# Patient Record
Sex: FEMALE | Race: BLACK OR AFRICAN AMERICAN
[De-identification: names, ages, dates, MRNs, and addresses within clinical notes are randomized per-mention and may not be internally consistent; named-entity substitution may affect disease eponyms.]

---

## 2020-06-30 ENCOUNTER — HOSPITAL ENCOUNTER (INPATIENT)
Dept: HOSPITAL 72 - EMR | Age: 65
LOS: 7 days | Discharge: NURSING, REHAB OR LONG-TERM CARE | DRG: 311 | End: 2020-07-07
Payer: MEDICARE

## 2020-06-30 VITALS — SYSTOLIC BLOOD PRESSURE: 156 MMHG | DIASTOLIC BLOOD PRESSURE: 81 MMHG

## 2020-06-30 VITALS — SYSTOLIC BLOOD PRESSURE: 182 MMHG | DIASTOLIC BLOOD PRESSURE: 88 MMHG

## 2020-06-30 VITALS — WEIGHT: 166.13 LBS | HEIGHT: 68 IN | BODY MASS INDEX: 25.18 KG/M2

## 2020-06-30 VITALS — SYSTOLIC BLOOD PRESSURE: 166 MMHG | DIASTOLIC BLOOD PRESSURE: 83 MMHG

## 2020-06-30 VITALS — DIASTOLIC BLOOD PRESSURE: 91 MMHG | SYSTOLIC BLOOD PRESSURE: 127 MMHG

## 2020-06-30 VITALS — DIASTOLIC BLOOD PRESSURE: 81 MMHG | SYSTOLIC BLOOD PRESSURE: 155 MMHG

## 2020-06-30 VITALS — DIASTOLIC BLOOD PRESSURE: 70 MMHG | SYSTOLIC BLOOD PRESSURE: 159 MMHG

## 2020-06-30 VITALS — DIASTOLIC BLOOD PRESSURE: 72 MMHG | SYSTOLIC BLOOD PRESSURE: 154 MMHG

## 2020-06-30 DIAGNOSIS — M48.061: ICD-10-CM

## 2020-06-30 DIAGNOSIS — M54.16: ICD-10-CM

## 2020-06-30 DIAGNOSIS — M19.90: ICD-10-CM

## 2020-06-30 DIAGNOSIS — R25.2: ICD-10-CM

## 2020-06-30 DIAGNOSIS — Z86.19: ICD-10-CM

## 2020-06-30 DIAGNOSIS — E11.9: ICD-10-CM

## 2020-06-30 DIAGNOSIS — Z86.73: ICD-10-CM

## 2020-06-30 DIAGNOSIS — K57.90: ICD-10-CM

## 2020-06-30 DIAGNOSIS — I10: ICD-10-CM

## 2020-06-30 DIAGNOSIS — E83.110: ICD-10-CM

## 2020-06-30 DIAGNOSIS — I24.9: Primary | ICD-10-CM

## 2020-06-30 DIAGNOSIS — M47.892: ICD-10-CM

## 2020-06-30 LAB
ADD MANUAL DIFF: NO
ALBUMIN SERPL-MCNC: 3.9 G/DL (ref 3.4–5)
ALBUMIN/GLOB SERPL: 1 {RATIO} (ref 1–2.7)
ALP SERPL-CCNC: 77 U/L (ref 46–116)
ALT SERPL-CCNC: 28 U/L (ref 12–78)
ANION GAP SERPL CALC-SCNC: 8 MMOL/L (ref 5–15)
AST SERPL-CCNC: 21 U/L (ref 15–37)
BASOPHILS NFR BLD AUTO: 0.8 % (ref 0–2)
BILIRUB SERPL-MCNC: 0.3 MG/DL (ref 0.2–1)
BUN SERPL-MCNC: 16 MG/DL (ref 7–18)
CALCIUM SERPL-MCNC: 8.7 MG/DL (ref 8.5–10.1)
CHLORIDE SERPL-SCNC: 103 MMOL/L (ref 98–107)
CK SERPL-CCNC: 191 U/L (ref 26–308)
CO2 SERPL-SCNC: 28 MMOL/L (ref 21–32)
CREAT SERPL-MCNC: 1 MG/DL (ref 0.55–1.3)
EOSINOPHIL NFR BLD AUTO: 1.9 % (ref 0–3)
ERYTHROCYTE [DISTWIDTH] IN BLOOD BY AUTOMATED COUNT: 11.6 % (ref 11.6–14.8)
GLOBULIN SER-MCNC: 4 G/DL
HCT VFR BLD CALC: 39.4 % (ref 37–47)
HGB BLD-MCNC: 13.5 G/DL (ref 12–16)
LYMPHOCYTES NFR BLD AUTO: 30 % (ref 20–45)
MCV RBC AUTO: 96 FL (ref 80–99)
MONOCYTES NFR BLD AUTO: 9.5 % (ref 1–10)
NEUTROPHILS NFR BLD AUTO: 57.8 % (ref 45–75)
PLATELET # BLD: 274 K/UL (ref 150–450)
POTASSIUM SERPL-SCNC: 4 MMOL/L (ref 3.5–5.1)
RBC # BLD AUTO: 4.11 M/UL (ref 4.2–5.4)
SODIUM SERPL-SCNC: 139 MMOL/L (ref 136–145)
WBC # BLD AUTO: 7 K/UL (ref 4.8–10.8)

## 2020-06-30 PROCEDURE — 83735 ASSAY OF MAGNESIUM: CPT

## 2020-06-30 PROCEDURE — 93970 EXTREMITY STUDY: CPT

## 2020-06-30 PROCEDURE — 84484 ASSAY OF TROPONIN QUANT: CPT

## 2020-06-30 PROCEDURE — 86140 C-REACTIVE PROTEIN: CPT

## 2020-06-30 PROCEDURE — 86780 TREPONEMA PALLIDUM: CPT

## 2020-06-30 PROCEDURE — 71045 X-RAY EXAM CHEST 1 VIEW: CPT

## 2020-06-30 PROCEDURE — 82962 GLUCOSE BLOOD TEST: CPT

## 2020-06-30 PROCEDURE — 83921 ORGANIC ACID SINGLE QUANT: CPT

## 2020-06-30 PROCEDURE — 82607 VITAMIN B-12: CPT

## 2020-06-30 PROCEDURE — 86592 SYPHILIS TEST NON-TREP QUAL: CPT

## 2020-06-30 PROCEDURE — 80053 COMPREHEN METABOLIC PANEL: CPT

## 2020-06-30 PROCEDURE — 84520 ASSAY OF UREA NITROGEN: CPT

## 2020-06-30 PROCEDURE — 72141 MRI NECK SPINE W/O DYE: CPT

## 2020-06-30 PROCEDURE — 80048 BASIC METABOLIC PNL TOTAL CA: CPT

## 2020-06-30 PROCEDURE — 36415 COLL VENOUS BLD VENIPUNCTURE: CPT

## 2020-06-30 PROCEDURE — 99285 EMERGENCY DEPT VISIT HI MDM: CPT

## 2020-06-30 PROCEDURE — 85025 COMPLETE CBC W/AUTO DIFF WBC: CPT

## 2020-06-30 PROCEDURE — 93005 ELECTROCARDIOGRAM TRACING: CPT

## 2020-06-30 PROCEDURE — 82550 ASSAY OF CK (CPK): CPT

## 2020-06-30 PROCEDURE — 71120 X-RAY EXAM BREASTBONE 2/>VWS: CPT

## 2020-06-30 PROCEDURE — 82565 ASSAY OF CREATININE: CPT

## 2020-06-30 PROCEDURE — 70553 MRI BRAIN STEM W/O & W/DYE: CPT

## 2020-06-30 PROCEDURE — 83036 HEMOGLOBIN GLYCOSYLATED A1C: CPT

## 2020-06-30 PROCEDURE — 86039 ANTINUCLEAR ANTIBODIES (ANA): CPT

## 2020-06-30 PROCEDURE — 72146 MRI CHEST SPINE W/O DYE: CPT

## 2020-06-30 PROCEDURE — 93306 TTE W/DOPPLER COMPLETE: CPT

## 2020-06-30 PROCEDURE — 85651 RBC SED RATE NONAUTOMATED: CPT

## 2020-06-30 PROCEDURE — 72148 MRI LUMBAR SPINE W/O DYE: CPT

## 2020-06-30 PROCEDURE — 96374 THER/PROPH/DIAG INJ IV PUSH: CPT

## 2020-06-30 RX ADMIN — INSULIN ASPART SCH UNITS: 100 INJECTION, SOLUTION INTRAVENOUS; SUBCUTANEOUS at 11:54

## 2020-06-30 RX ADMIN — INSULIN ASPART SCH UNITS: 100 INJECTION, SOLUTION INTRAVENOUS; SUBCUTANEOUS at 06:33

## 2020-06-30 RX ADMIN — MORPHINE SULFATE PRN MG: 4 INJECTION INTRAVENOUS at 06:50

## 2020-06-30 RX ADMIN — MORPHINE SULFATE PRN MG: 4 INJECTION INTRAVENOUS at 20:27

## 2020-06-30 RX ADMIN — ENOXAPARIN SODIUM SCH MG: 40 INJECTION SUBCUTANEOUS at 08:41

## 2020-06-30 RX ADMIN — INSULIN ASPART SCH UNITS: 100 INJECTION, SOLUTION INTRAVENOUS; SUBCUTANEOUS at 16:30

## 2020-06-30 RX ADMIN — MORPHINE SULFATE PRN MG: 4 INJECTION INTRAVENOUS at 13:09

## 2020-06-30 RX ADMIN — ENALAPRIL MALEATE SCH MG: 5 TABLET ORAL at 13:09

## 2020-06-30 RX ADMIN — INSULIN ASPART SCH UNITS: 100 INJECTION, SOLUTION INTRAVENOUS; SUBCUTANEOUS at 20:28

## 2020-06-30 RX ADMIN — MORPHINE SULFATE PRN MG: 4 INJECTION INTRAVENOUS at 03:13

## 2020-06-30 RX ADMIN — ASPIRIN 81 MG SCH MG: 81 TABLET ORAL at 13:09

## 2020-06-30 NOTE — NUR
NURSE NOTES:

Received patient report from DIANA Perez. Patient shows no signs of distress or pain at the 
time. Patient states she is feeling much better. AO x 4. Per RN, Dr. Sebastian is aware of 
troponin level being 0.167. IV patent and flushed. There are no signs of erythema, 
infiltration, or bleeding. Bed is in the lowest position, call light is within reach, side 
rails up x 3. Will continue to monitor. One more month of doxycycline has been sent in for the hidradenitis. Would advise pt, ok to start taking Uloric. Would take naproxen once daily with this for the first 3 months  to help try to prevent a flare up. Please let me know if he needs refills. Thanks.

## 2020-06-30 NOTE — NUR
HAND-OFF: 

Report given to DIANA Perez. Patient shows no signs of distress. Patient is still complaining 
about pain after morphine 4mg given. Endorsed plan of care.

## 2020-06-30 NOTE — CONSULTATION
DATE OF CONSULTATION:  06/30/2020

REFERRING PHYSICIAN:  Ketan Munroe M.D.



REASON FOR REFERRAL:  Chest pain.



HISTORY OF PRESENT ILLNESS:  This is a 64-year-old female who has several

medical problems as detailed below.  The patient presents to the hospital

because of recurrent bouts of pain in the shoulders on the left side,

sometimes on the right side.  She has been having some cramping in her

legs that come all the way up, sometimes from her foot or calf to her

thigh to her chest on the right side and to the shoulders and to the head,

and these pains usually last about 2 to 3 days, though she has been having

severe spasms more recently and the severe spasm would cause the patient

to come to the hospital.  She has a capacity to walk with a walker,

although her walker is loose.  She has had a couple of falls, although she

gets on her knees.  She does not end up falling down completely as she has

on prior occasions, where she was hospitalized at [facility] last year.  There

is occasional shortness of breath.  There is no PND, although she uses two

pillows at night to sleep with.  She starts out with many, but by the time

she wakes up in the morning, there are only 2 pillows and that is what she

is comfortable with.  She has occasional palpitations.  She denies any

lightheadedness.  She has not had any cardiac problems at all.



PAST MEDICAL HISTORY:  Positive for history of fall, multifactorial,

situational, mechanical, and small focus of left supraspinous tendon, not

full-thickness, secondary to fall, history of rib pain, flank pain, likely

felt to be musculoskeletal.  No rib fractures.  History of left hip pain

without a fracture, history of chest pain, shortness of breath, lumbar

radiculopathy, chronic low back pain, neck pain, diabetes mellitus type 2,

spondylosis, history of hepatitis C, for which she took Harvoni and got

cleared according to herself, history of hypertension, cervical

spondylosis, osteoarthritis, foraminal stenosis of the cervical spine,

hereditary hemochromatosis according to the chart, and history of

trigeminal neuralgia.  She has had a history of appendectomy and history

of back surgery with L5-S1 laminectomy, diskectomy, foraminectomy.



ALLERGIES:  She is reportedly allergic to doxycycline, Norco, and

tramadol.



SOCIAL HISTORY:  Quit 15 years ago tobacco.  Denies any alcohol or drugs.

She lives at home.  Her son lives with her.



REVIEW OF SYSTEMS:  GI:  Positive for some nausea and some constipation.

No black or bloody stools.    :  Negative.  PULMONARY:  Negative.

CONSTITUTIONAL:  Negative.    NEUROLOGIC:  As mentioned in HPI.



PHYSICAL EXAMINATION:

GENERAL:  A middle-aged female, in no respiratory distress, although in the

middle of my evaluation, she developed a severe spasm, started out in her

foot all the way down to her leg on the right side and eventually left.

She was very uncomfortable at that time.

LUNGS:  Clear to auscultation, percussion.

CARDIAC:  S1 is normal.  S2 is normal.  Regular rate and rhythm.  No heaves

or thrills.

CHEST WALL:  The deltopectoral groove area and the surrounding area is

tender to palpation, and she indicates this seems to be the same pain that

she has been having for the past few months intermittently.  She is not

able to lift her left arm up above her shoulder because of significant

pain.

ABDOMEN:  Soft, obese, positive bowel sounds.

EXTREMITIES:  There is no clubbing, cyanosis.  There is no edema.

NEUROLOGIC:  She is awake and alert and responsive.  Her spasm that she was

experiencing lasted only a matter of 15 seconds or so.



LABS:  Her white count is 7, hemoglobin 13.5, and platelet count of 274.

Sodium is 139, potassium 4.0, chloride 103, bicarb 28, BUN of 16,

creatinine 1.0, and glucose of 345.  Her blood sugar has been 242 to 345.

Troponins have been 0.014 and 0.0145, and vitamin B12 level is pending.

She did have x-ray of her chest that showed lungs and pleural space to be

clear.  No acute processes.



ASSESSMENT AND PLAN:

1. Chest pain, possible left chest wall pain.

2. Leg cramps.

3. Diabetes mellitus.

4. Hypertension.

5. Diverticulosis.

6. Cervical spine and lumbar spine radiculopathy.



Dr. Munroe, this patient was seen in cardiac consultation.  It seemed

that the pain may be exacerbated by movement of the left arm and then

palpation of the chest wall, suggestive the pain is related to

musculoskeletal in origin.  Her cardiac enzymes are minimally abnormal,

but appeared to be relatively flat.  A third one will be ordered.  Her

telemetry is sinus.  The EKG shows normal sinus rhythm, no ST or T-wave

abnormalities.  An echocardiogram will be ordered for evaluation of wall

motion and further recommendation depending on the results of the

testing.









  ______________________________________________

  Santo Sebastian M.D.





DR:  FAINA

D:  06/30/2020 13:16

T:  06/30/2020 16:34

JOB#:  [id number]

CC:

## 2020-06-30 NOTE — NUR
NURSE NOTES:

Received patient report from DONNY Maddox RN. Patient is AO x 4. Patient is in 9/ 10 pain from 
the muscles spasms that come and go on the right side of her leg and goes up to her chest. 
Patients blood pressure upon arrival was 205/97. IV is patent and flushed. There are no 
signs of erythema, infiltration, or bleeding. Bed is in the lowest position, call light is 
within reach. Will call her Doctor to add orders.

## 2020-06-30 NOTE — NUR
ED Nurse Note:



Pt is stable for transfer to tele unit at this time per ERMD. Pt is aaox4, no 
respiratory distress. Pt taken to unit via gurney, connected to cardiac monitor 
by tech and RN. Pt vitals are stable. Endorsed to receiving RN that pt was 
given morphine just prior to transfer to unit. Pt IV is patent and intact. Pt 
belongings sent with pt.

## 2020-06-30 NOTE — NUR
NURSE NOTES:

Spoke to Dr. Munroe. Informed him about patients troponin level being 0.145, no new orders 
given. Also informed him of the muscle spasms, he ordered baclofen 10 mg. For the high blood 
pressure he ordered hydralazine 25 mg. Ordered the patient to be on a sliding scale and on a 
diabetic diet. Will carry out orders.

## 2020-06-30 NOTE — EMERGENCY ROOM REPORT
History of Present Illness


General


Chief Complaint:  Pain


Source:  Patient





Present Illness


HPI


64-year-old female presents the ED for evaluation.  Complaining of cramping 

pain in her legs.  Started a few days ago.  Radiating to her abdomen and chest.

  10 out of 10, throbbing, nonradiating.  Denies shortness of breath.  States 

that she suffered a fall in October last year and had to stay in a skilled 

nursing facility until about January of this year.  States that he had cramping 

sensations like this here and there in the past but never this severe.  No 

other aggravating relieving factors.  Denies any other associated symptoms


Allergies:  


Coded Allergies:  


     No Known Allergies (Verified , 6/7/11)





COVID-19 Screening


Contact w/high risk pt:  No


Recent Travel to affected area:  No


Experienced COVID-19 symptoms?:  No


COVID-19 Testing performed PTA:  No





Patient History


Past Medical History:  DM, HTN


Past Surgical History:  none


Pertinent Family History:  none


Social History:  Denies: smoking, alcohol use, drug use


Pregnant Now:  No


Immunizations:  UTD


Reviewed Nursing Documentation:  PMH: Agreed; PSxH: Agreed





Nursing Documentation-PMH


Past Medical History:  No History, Except For


Hx Hypertension:  Yes


Hx Diabetes:  Yes





Review of Systems


All Other Systems:  negative except mentioned in HPI





Physical Exam





Vital Signs








  Date Time  Temp Pulse Resp B/P (MAP) Pulse Ox O2 Delivery O2 Flow Rate FiO2


 


6/30/20 00:45 98.6 82 16 127/91 (103) 99 Room Air  








Sp02 EP Interpretation:  reviewed, normal


General Appearance:  alert, GCS 15, non-toxic, mild distress


Head:  normocephalic, atraumatic


Eyes:  bilateral eye normal inspection, bilateral eye PERRL


ENT:  hearing grossly normal, normal pharynx, no angioedema, normal voice


Neck:  full range of motion, supple/symm/no masses


Respiratory:  chest non-tender, lungs clear, normal breath sounds, speaking 

full sentences


Cardiovascular #1:  regular rate, rhythm, no edema


Cardiovascular #2:  2+ carotid (R), 2+ carotid (L), 2+ radial (R), 2+ radial (L)

, 2+ dorsalis pedis (R), 2+ dorsalis pedis (L)


Gastrointestinal:  normal bowel sounds, non tender, soft, non-distended, no 

guarding, no rebound


Rectal:  deferred


Genitourinary:  normal inspection, no CVA tenderness


Musculoskeletal:  back normal, normal range of motion, gait/station normal, non-

tender


Neurologic:  alert, motor strength/tone normal, oriented x3, sensory intact, 

responsive, speech normal


Psychiatric:  judgement/insight normal, memory normal, mood/affect normal, no 

suicidal/homicidal ideation


Reflexes:  3+ bicep (R), 3+ bicep (L), 3+ tricep (R), 3+ tricep (L), 3+ knee (R)

, 3+ knee (L)


Skin:  no rash


Lymphatic:  no adenopathy





Medical Decision Making


Diagnostic Impression:  


 Primary Impression:  


 ACS (acute coronary syndrome)


 Additional Impression:  


 Muscle cramps


ER Course


Hospital Course 


64-year-old female presents with cramping pains in the legs abdomen and chest.





Differential diagnoses include: MI/unstable angina, contusion, muscle strain





Clinical course


Patient placed on stretcher. on cardiac monitor. After initial history and 

physical I ordered labs, EKG, chest x-ray, valium, morphine 





labs reviewed- no leukocytosis, hb/hct stable, electrolytes ok, trop 0.145


EKG - NSR no acute ischemic changes interpreted by me 


Chest x-ray- no acute process





given aspirin. given hydralazine for elevated BP





Case discussed with Dr. Munroe  and he agreed to accept the patient to his 

service for further care and support





I.  I feel this is a highly complex case requiring extensive working including 

EKG/Rhythm strip, Xray/CT/US, Blood/urine lab work, repeat exams while in ED, 

and administration of strong opiates/narcotics for pain control, admission to 

hospital or close patient follow up.  





Diagnosis - ACS, muscle cramps





admitted to telemetry in serious condition





Labs








Test


  6/30/20


01:15


 


White Blood Count


  7.0 K/UL


(4.8-10.8)


 


Red Blood Count


  4.11 M/UL


(4.20-5.40)


 


Hemoglobin


  13.5 G/DL


(12.0-16.0)


 


Hematocrit


  39.4 %


(37.0-47.0)


 


Mean Corpuscular Volume 96 FL (80-99) 


 


Mean Corpuscular Hemoglobin


  32.9 PG


(27.0-31.0)


 


Mean Corpuscular Hemoglobin


Concent 34.4 G/DL


(32.0-36.0)


 


Red Cell Distribution Width


  11.6 %


(11.6-14.8)


 


Platelet Count


  274 K/UL


(150-450)


 


Mean Platelet Volume


  7.1 FL


(6.5-10.1)


 


Neutrophils (%) (Auto)


  57.8 %


(45.0-75.0)


 


Lymphocytes (%) (Auto)


  30.0 %


(20.0-45.0)


 


Monocytes (%) (Auto)


  9.5 %


(1.0-10.0)


 


Eosinophils (%) (Auto)


  1.9 %


(0.0-3.0)


 


Basophils (%) (Auto)


  0.8 %


(0.0-2.0)


 


Sodium Level


  139 MMOL/L


(136-145)


 


Potassium Level


  4.0 MMOL/L


(3.5-5.1)


 


Chloride Level


  103 MMOL/L


()


 


Carbon Dioxide Level


  28 MMOL/L


(21-32)


 


Anion Gap


  8 mmol/L


(5-15)


 


Blood Urea Nitrogen


  16 mg/dL


(7-18)


 


Creatinine


  1.0 MG/DL


(0.55-1.30)


 


Estimat Glomerular Filtration


Rate > 60 mL/min


(>60)


 


Glucose Level


  345 MG/DL


()


 


Calcium Level


  8.7 MG/DL


(8.5-10.1)


 


Total Bilirubin


  0.3 MG/DL


(0.2-1.0)


 


Aspartate Amino Transf


(AST/SGOT) 21 U/L (15-37) 


 


 


Alanine Aminotransferase


(ALT/SGPT) 28 U/L (12-78) 


 


 


Alkaline Phosphatase


  77 U/L


()


 


Total Creatine Kinase


  191 U/L


()


 


Troponin I


  0.145 ng/mL


(0.000-0.056)


 


Total Protein


  7.9 G/DL


(6.4-8.2)


 


Albumin


  3.9 G/DL


(3.4-5.0)


 


Globulin 4.0 g/dL 


 


Albumin/Globulin Ratio 1.0 (1.0-2.7) 








EKG Diagnostic Results


Rate:  normal


Rhythm:  NSR


ST Segments:  no acute changes


ASA given to the pt in ED:  Yes





Rhythm Strip Diag. Results


EP Interpretation:  yes


Rhythm:  NSR, no PVC's, no ectopy





Chest X-Ray Diagnostic Results


Chest X-Ray Diagnostic Results :  


   Chest X-Ray Ordered:  Yes


   # of Views/Limited/Complete:  1 View


   Indication:  Chest Pain


   EP Interpretation:  Yes


   Interpretation:  no consolidation, no effusion, no pneumothorax, no acute 

cardiopulmonary disease


   Impression:  No acute disease


   Electronically Signed by:  Electronically signed by Salvador Roberts MD





Last Vital Signs








  Date Time  Temp Pulse Resp B/P (MAP) Pulse Ox O2 Delivery O2 Flow Rate FiO2


 


6/30/20 00:55 98.6 82 16 127/91 99 Room Air  








Status:  improved


Disposition:  ADMITTED AS INPATIENT


Condition:  Serious


Referrals:  


Ketan Munroe MD (PCP)











Salvador Roberts MD Jun 30, 2020 02:54

## 2020-06-30 NOTE — HISTORY AND PHYSICAL REPORT
DATE OF ADMISSION:  06/30/2020

HISTORY OF PRESENT ILLNESS:  This is a 64-year-old female with history of

falls, diabetes, history of small-focus tear of the left supraspinatus

muscle, hypertension, chronic left hip pain and lower back pain,

trigeminal neuralgia, hereditary hemochromatosis, who presented to the

emergency room for generalized body pain and severe muscle spasms that

were worsening over the last 3 days.  She states that she has leg pain

with spasms that travel up her body.  She also has lower back pain.  She

states her lower back pain is better with sitting and worse with standing.

She states her leg pain travels up her body accompanied by spasms and

leaves her with headaches and numbness.  She also has been having chest

pain, substernal in location, radiating to the left shoulder as well as

the neck over the last 6 weeks.  She states the chest pain has an

exertional component.  She denies any shortness of breath.  She has

history of cramping in the legs; however, nothing has been this severe.

She denies nocturnal cramping.  She denies cramping getting better with

moving her legs.



PAST MEDICAL HISTORY:  Includes diabetes, hypertension, dysautonomia,

chronic left hip pain, trigeminal neuralgia, and hereditary

hemochromatosis.



PAST SURGICAL HISTORY:  None.



SOCIAL HISTORY:  The patient is a nonsmoker.  She does not use any drugs or

drink alcohol.



MEDICATIONS:  Reviewed in [place].



ALLERGIES:  No known drug allergies.



REVIEW OF SYSTEMS:  A 12-point review of systems negative except for

pertinent positives as mentioned above.



PHYSICAL EXAMINATION:

VITAL SIGNS:  Temperature 98.6, pulse is 82, respiratory rate 16, blood

pressure 127/91.

GENERAL:  No acute distress.  The patient is alert, awake, and oriented

x3.

HEENT:  Normocephalic/atraumatic.

NECK:  Supple.  No JVD.

LUNGS:  Clear to auscultation bilaterally.  No crackles, rhonchi, or

rales.

CARDIOVASCULAR:  Regular rate and rhythm.  Normal S1, S2.

ABDOMEN:  Soft, nontender, and nondistended.

EXTREMITIES:  No clubbing, cyanosis, or edema.

NEUROLOGIC:  Motor, the patient can lift the right lower leg 4/5 and the

left lower leg 3/5.  Cranial nerves II through XII intact.



DIAGNOSTIC DATA:  EKG shows normal sinus rhythm.  No ST changes.  X-ray

shows no acute process.



LABORATORY DATA:  CBC, white count of 7, hemoglobin 13.5, and platelet

count 274,000.  BMP, sodium 139, potassium is 4, chloride is 103, CO2 is

28, BUN is 16, creatinine is 1, glucose is 345, AST is 21, ALT is 28,

alkaline phosphatase 77.  Troponin is 0.145.



ASSESSMENT:

1. Lower extremity pain, radiating up to the lower back, rule out spinal

stenosis.

2. Chest pain with intermediate troponin.

3. Diabetes.

4. Hypertension.

5. History of trigeminal neuralgia.

6. Hemochromatosis.



PLAN:

1. Admit the patient to telemetry.

2. Cardiology consult.

3. Neurology consult.

4. Baclofen 10 mg p.o. b.i.d. for muscle spasms.

5. MRI of spine.

6. Check ESR, vitamin B12, HAL.

7. Start aspirin.

8. Full code.

9. DVT prophylaxis given.









  ______________________________________________

  Ketan Munroe MD





DR:  CHARLY

D:  06/30/2020 12:27

T:  06/30/2020 18:02

JOB#:  [id number]

CC:

## 2020-06-30 NOTE — DIAGNOSTIC IMAGING REPORT
MRI LUMBAR SPINE WITHOUT CONTRAST

 

Indication: Reason For Exam: Weakness, spasms

 

Technique: Sagittal T1 and T2 fast spin echo, sagittal STIR, axial T1 and T2 fast

spin-echo images of the lumbar spine

 

Comparison: MRI lumbar spine dated 6/11/2011

 

Findings:

 

Lumbar vertebral body heights are maintained. There are multilevel reactive endplate

marrow changes. There is multilevel disc desiccation.

 

The conus is unremarkable and terminates at the T12-L1 level. 

 

L1-2: No significant disc bulge, central or foraminal stenosis. 

 

L2-3: Disc osteophyte complex formation with moderate broad-based disc bulge and

ligamentum flavum hypertrophy leading to lateral recess narrowing and mild spinal

canal stenosis. There is mild right and moderate left foraminal narrowing with

abutment of the exiting left L2 nerve root.

 

L3-4: Disc osteophyte complex formation with moderate broad-based disc bulge and

ligament flavum and hypertrophy leading to mild spinal canal stenosis. There is mild

bilateral foraminal narrowing with abutment of the exiting left L3 nerve root.

 

L4-5: Disc osteophyte complex formation with moderate broad-based disc bulge and

ligamentum flavum hypertrophy leading to mild spinal canal stenosis. There is lateral

recess narrowing. There is moderate bilateral foraminal narrowing with abutment of

the exiting L4 nerve roots.

 

L5-S1: Disc osteophyte complex formation with moderate left eccentric broad-based

disc herniation, ligamentum flavum hypertrophy, and bilateral facet arthrosis,

leading to bilateral lateral recess narrowing. There is severe left and moderate

right foraminal narrowing with abutment of the exiting left L5 nerve root

 

Impression: 

 

Multiple level discogenic degenerative disease as described in detail above, which

has progressed slightly from prior examination.

## 2020-06-30 NOTE — NUR
ED Nurse Note:



Pt brought into ED from home by [facility] for c/o generalized body pain with 
severe muscle spasms that have become worse over the last 3 days. Pt is aaox4, 
breathing is normal and unlabored. Pt appears moderately anxious. No cough, SOB 
or fever. Pt blood sugar was 394 on scene per [facility]. Pt connected to cardiac 
monitor, safety measures in place. Will continue to monitor.

## 2020-06-30 NOTE — NUR
CASE MANAGEMENT:REVIEW



64 YR OLD FEMALE BIBA FROM HOME



CC:BODY  PAIN AND MUSCLE SPASMS



SI: ACS. MUSCLE CRAMPS

98.6   82  16  127/91  99% ON RA

GLUCOSE+345  TROPONIN(+) 0.145 AND 0.140



IS: IV MORPHINE 

500CC NS BOLUS

VALIUM PO

ASA PO

CHEST XRAY

**: TO TELEMETRY 

DCP: FROM HOME



PLAN:

SERIAL TROPONIN'S

MRI SPINE

## 2020-06-30 NOTE — NUR
NURSE NOTES:

Received report from DIANA Wheatley. Pt awake, A/O x4, still complains of 8/10 pain that started 
from L lower extremity and radiates up to the chest. Morphine was administered per RN. 
Observed mild L lip drooping, per pt, she has a hx of TIA about 3yrs ago. No s/sx of acute 
distress, breathing even and unlabored in RA. Received a call from lab regarding troponin 
level of 0.140 (from 0.145 yesterday). Bed on lowest position, call light within reach. Will 
continue plan of care.

## 2020-07-01 VITALS — SYSTOLIC BLOOD PRESSURE: 132 MMHG | DIASTOLIC BLOOD PRESSURE: 72 MMHG

## 2020-07-01 VITALS — DIASTOLIC BLOOD PRESSURE: 81 MMHG | SYSTOLIC BLOOD PRESSURE: 167 MMHG

## 2020-07-01 VITALS — DIASTOLIC BLOOD PRESSURE: 78 MMHG | SYSTOLIC BLOOD PRESSURE: 126 MMHG

## 2020-07-01 VITALS — DIASTOLIC BLOOD PRESSURE: 79 MMHG | SYSTOLIC BLOOD PRESSURE: 157 MMHG

## 2020-07-01 VITALS — SYSTOLIC BLOOD PRESSURE: 164 MMHG | DIASTOLIC BLOOD PRESSURE: 76 MMHG

## 2020-07-01 VITALS — SYSTOLIC BLOOD PRESSURE: 133 MMHG | DIASTOLIC BLOOD PRESSURE: 76 MMHG

## 2020-07-01 LAB
CK SERPL-CCNC: 73 U/L (ref 26–140)
CREAT SERPL-MCNC: 0.9 MG/DL (ref 0.55–1.3)

## 2020-07-01 RX ADMIN — ENALAPRIL MALEATE SCH MG: 5 TABLET ORAL at 09:11

## 2020-07-01 RX ADMIN — METHYLPREDNISOLONE SCH MG: 4 TABLET ORAL at 21:09

## 2020-07-01 RX ADMIN — MORPHINE SULFATE PRN MG: 4 INJECTION INTRAVENOUS at 04:24

## 2020-07-01 RX ADMIN — ASPIRIN 81 MG SCH MG: 81 TABLET ORAL at 09:08

## 2020-07-01 RX ADMIN — MORPHINE SULFATE PRN MG: 4 INJECTION INTRAVENOUS at 22:08

## 2020-07-01 RX ADMIN — INSULIN ASPART SCH UNITS: 100 INJECTION, SOLUTION INTRAVENOUS; SUBCUTANEOUS at 17:25

## 2020-07-01 RX ADMIN — MORPHINE SULFATE PRN MG: 4 INJECTION INTRAVENOUS at 13:03

## 2020-07-01 RX ADMIN — ENOXAPARIN SODIUM SCH MG: 40 INJECTION SUBCUTANEOUS at 09:05

## 2020-07-01 RX ADMIN — INSULIN ASPART SCH UNITS: 100 INJECTION, SOLUTION INTRAVENOUS; SUBCUTANEOUS at 11:30

## 2020-07-01 RX ADMIN — INSULIN ASPART SCH UNITS: 100 INJECTION, SOLUTION INTRAVENOUS; SUBCUTANEOUS at 21:10

## 2020-07-01 RX ADMIN — CYCLOBENZAPRINE HYDROCHLORIDE SCH MG: 10 TABLET, FILM COATED ORAL at 23:04

## 2020-07-01 RX ADMIN — INSULIN ASPART SCH UNITS: 100 INJECTION, SOLUTION INTRAVENOUS; SUBCUTANEOUS at 06:00

## 2020-07-01 RX ADMIN — MORPHINE SULFATE PRN MG: 4 INJECTION INTRAVENOUS at 00:33

## 2020-07-01 RX ADMIN — HYDRALAZINE HYDROCHLORIDE PRN MG: 25 TABLET ORAL at 23:58

## 2020-07-01 RX ADMIN — MORPHINE SULFATE PRN MG: 4 INJECTION INTRAVENOUS at 09:08

## 2020-07-01 RX ADMIN — MORPHINE SULFATE PRN MG: 4 INJECTION INTRAVENOUS at 18:08

## 2020-07-01 NOTE — INTERNAL MED PROGRESS NOTE
Subjective


Physician Name


Ketan Munroe


Attending Physician


Ketan Munroe MD





Current Medications








 Medications


  (Trade)  Dose


 Ordered  Sig/Toni


 Route


 PRN Reason  Start Time


 Stop Time Status Last Admin


Dose Admin


 


 Acetaminophen


  (Tylenol)  650 mg  Q4H  PRN


 ORAL


 Mild Pain (Pain Scale 1-3)  6/30/20 02:30


 7/30/20 02:29   


 


 


 Aspirin


  (ASA)  81 mg  DAILY


 NG


   6/30/20 12:30


 8/14/20 12:29  7/1/20 09:08


 


 


 Baclofen


  (Lioresal)  10 mg  BID


 ORAL


   6/30/20 09:00


 7/30/20 08:59  7/1/20 17:20


 


 


 Dextrose


  (Dextrose 50%)  25 ml  Q30M  PRN


 IV


 Hypoglycemia  6/30/20 04:30


 9/28/20 04:29   


 


 


 Dextrose


  (Dextrose 50%)  50 ml  Q30M  PRN


 IV


 Hypoglycemia  6/30/20 04:30


 9/28/20 04:29   


 


 


 Enalapril Maleate


  (Vasotec)  10 mg  DAILY


 ORAL


   6/30/20 12:15


 7/30/20 12:14  7/1/20 09:11


 


 


 Enoxaparin Sodium


  (Lovenox)  40 mg  Q24H


 SUBQ


   6/30/20 09:00


 9/28/20 08:59  7/1/20 09:05


 


 


 Gadobutrol


  (Gadavist)  7.5 mmol  NOW  PRN


 IV


 Radiology Procedure  7/1/20 15:30


 7/5/20 15:23   


 


 


 Hydralazine HCl


  (Apresoline)  25 mg  Q6H  PRN


 ORAL


 For High Blood Pressure  6/30/20 04:30


 9/28/20 04:29   


 


 


 Insulin Aspart


  (NovoLOG)    BEFORE MEALS AND  HS


 SUBQ


   6/30/20 06:30


 9/28/20 06:29  7/1/20 17:25


 


 


 Morphine Sulfate


  (Morphine


 Sulfate)  1 mg  Q4H  PRN


 IVP


 Moderate Pain (Pain Scale 4-6)  6/30/20 02:30


 7/7/20 02:29   


 


 


 Morphine Sulfate


  (Morphine


 Sulfate)  4 mg  Q4H  PRN


 IVP


 Severe Pain (Pain Scale 7-10)  6/30/20 02:30


 7/7/20 02:29  7/1/20 18:08


 


 


 Ondansetron HCl


  (Zofran)  4 mg  Q6H  PRN


 IVP


 Nausea & Vomiting  6/30/20 02:30


 7/30/20 02:29   


 


 


 Polyethylene


 Glycol


  (Miralax)  17 gm  HSPRN  PRN


 ORAL


 Constipation  6/30/20 02:30


 7/30/20 02:29   


 








Allergies:  


Coded Allergies:  


     No Known Allergies (Verified , 6/7/11)


ROS Limited/Unobtainable:  No


All Systems:  reviewed and negative except above - leg cramps bilaterally ; 

lower back pain





Objective





Last Vital Signs








  Date Time  Temp Pulse Resp B/P (MAP) Pulse Ox O2 Delivery O2 Flow Rate FiO2


 


7/1/20 16:00 97.7 85 18 126/78 (94) 99   


 


7/1/20 09:00      Room Air  











Laboratory Tests








Test


  6/30/20


20:24 7/1/20


05:05 7/1/20


05:42 7/1/20


16:20


 


POC Whole Blood Glucose


  321 MG/DL


()  H 


  161 MG/DL


()  H 


 


 


Troponin I


  


  0.158 ng/mL


(0.000-0.056) 


  


 


 


Erythrocyte Sedimentation Rate


  


  


  


  45 MM/HR


(0-30)  H


 


Blood Urea Nitrogen


  


  


  


  13 mg/dL


(7-18)


 


Creatinine


  


  


  


  0.9 MG/DL


(0.55-1.30)


 


Estimat Glomerular Filtration


Rate 


  


  


  > 60 mL/min


(>60)


 


Total Creatine Kinase


  


  


  


  73 U/L


()


 


C-Reactive Protein,


Quantitative 


  


  


  2.4 mg/dL


(0.00-0.90)  H


 


Vitamin B12 Level


  


  


  


  703 PG/ML


(193-986)


 


Methylmalonic Acid    Pending  


 


Rapid Plasma Reagin    Pending  


 


Treponema pallidum Ab


(FTA-ABS) 


  


  


  Pending  


 

















Intake and Output  


 


 6/30/20 7/1/20





 19:00 07:00


 


Intake Total 600 ml 240 ml


 


Output Total  500 ml


 


Balance 600 ml -260 ml


 


  


 


Intake Oral 600 ml 240 ml


 


Output Urine Total  500 ml


 


# Voids 5 1








Objective


GENERAL:  No acute distress.  The patient is alert, awake, and oriented


x3.


HEENT:  Normocephalic/atraumatic.


NECK:  Supple.  No JVD.


LUNGS:  Clear to auscultation bilaterally.  No crackles, rhonchi, or


rales.


CARDIOVASCULAR:  Regular rate and rhythm.  Normal S1, S2.


ABDOMEN:  Soft, nontender, and nondistended.


EXTREMITIES:  No clubbing, cyanosis, or edema.


NEUROLOGIC:  Motor, the patient can lift the right lower leg 4/5 and the


left lower leg 3/5.  Cranial nerves II through XII intact.





Assessment/Plan


Assessment/Plan





ASSESSMENT:


1. Lumbar pain 2/2 severe left and moderate right foraminal narrowing with 

abutment of the exiting left L5 nerve root





2. Chest pain with intermediate troponin.


3. Diabetes.


4. Hypertension.


5. History of trigeminal neuralgia.


6. Hemochromatosis.





PLAN:


1.  telemetry.


2. Cardiology consult.


3. Neurology consult.


4. Baclofen 10 mg p.o. b.i.d. for muscle spasms.


5. MRI of spine (C/T/L) - shows  severe left and moderate right foraminal 

narrowing with abutment of the exiting left L5 nerve root


6. PT evaluation 


7. cw aspirin.


8. Full code.


9. DVT prophylaxis given.


10. Neurosurgery evaluation 


11. trial of decadron


12. consider lumbar epidural











Ketan Munroe MD Jul 1, 2020 18:29

## 2020-07-01 NOTE — NUR
HAND-OFF: 

Report given to DIANA Perez. Patient shows no signs of distress or pain at the time. Endorsed 
plan of care.

## 2020-07-01 NOTE — DIAGNOSTIC IMAGING REPORT
Indication: Lower extremity pain and weakness, lower back pain, full body stable

spasms 

 

Technique: Sagittal T1 FLAIR PROPELLER, sagittal T2 PROPELLOR, sagittal STIR, axial

T2 PROPELLER, axial 3D COSMIC ASPIR images were obtained through the cervical spine

 

Comparison: none

 

Findings: Bony alignment is normal. Vertebral body heights are preserved. Vertebral

marrow signal is normal. Intrinsic cord signal is normal.

 

At C2-3, there is minimal degenerative disc narrowing. No significant disc bulge or

protrusion, spinal stenosis, or neural foraminal stenosis.

 

At C3-4, there is mild degenerative disc narrowing and endplate irregularity.

Circumferential annular bulge results in borderline narrowing of the spinal canal.

There is moderate bilateral neural foraminal stenosis predominantly due to uncinate

hypertrophy.

 

At C4-5, there is mild degenerative disc narrowing and endplate irregularity. There

is circumferential annular bulge as well as right lateral paracentral disc

protrusion. This may impinge upon the right side of the cord, definitely impinges on

the lateral recess, and results in borderline narrowing of the central spinal canal.

There is mild to moderate right, moderate left neural foraminal stenosis at this

level.

 

At C5-6, there is mild to moderate degenerative disc narrowing and endplate

irregularity. There is generalized circumferential annular bulge which results in

borderline narrowing of the spinal canal centrally. There is also right paracentral

disc protrusion/osteophyte complex which may narrow the right lateral recess. There

is mild right, mild-to-moderate left neural foraminal stenosis.

 

At C6-7, there is minimal degenerative disc narrowing. There is generalized

circumferential annular bulge, which results in borderline narrowing of the spinal

canal. There is mild right, mild-to-moderate left neural foraminal stenosis at this

level.

 

At C7-T1, no significant disc bulge or protrusion, spinal stenosis, or neural

foraminal narrowing.

 

Included extra spinal soft tissues are unremarkable.

 

Impression: No acute abnormality

 

Degenerative changes as detailed on a level by level basis above.

## 2020-07-01 NOTE — NUR
NURSE NOTES:

Contacted and spoke with Dr Munroe. Informed him that pt said the baclofen bid was not 
helping her and wanted a change to the regimen. He dc'd the baclofen and started flexeril 10 
mg tid, giving first dose now. Orders entered and carried out. Will administer as soon as 
verified with pharmacy- [facility]. Will reassess.

## 2020-07-01 NOTE — NUR
NURSE NOTES:

Patient received from Ana ORTIZ. Patient in stable condition. Saturating well on Room air at 
97%. IV site on Righjt AC 20G intact and patent. Alert and oriented x4. Noted to be in pain 
at this moment 10/10, will give PRN morphine when ready for administration. Purewick working 
well. Bed is in lowest position and locked. Call light and bedside table within reach. Will 
continue plan of care.

## 2020-07-01 NOTE — NUR
HAND-OFF: 

Report given to DIANA Rowell and Gilda RN. Pt in stable condition, endorsed plan of care.

## 2020-07-01 NOTE — CARDIOLOGY PROGRESS NOTE
Assessment/Plan


Assessment/Plan


1. Chest pain, possible left chest wall pain.


2. Leg cramps.


3. Diabetes mellitus.


4. Hypertension.


5. Diverticulosis.


6. Cervical spine and lumbar spine radiculopathy








still with tenderness / pian 


trop relatively falt will repeat level tonite en in am 


ekg is poor  placed incorrectly i am not sure it belong to this pt ,

i have asked rn to repeat ekg tonite will order one for tomorrow as well


duplex neg 


spin report ntoed 


echo reviewed personally wall motion is normal 


dependign onthe ekg mauy consider other option





Subjective


Cardiovascular:  Reports: chest pain; Denies: lightheadedness, palpitations


Respiratory:  Denies: shortness of breath


Gastrointestinal/Abdominal:  Denies: abdominal pain


Genitourinary:  Denies: burning


Subjective


pain present all the tiem tnder to touch and with movmen of the left arm has 

pain in barrie shoudler , requesting more pain med





Objective





Last 24 Hour Vital Signs








  Date Time  Temp Pulse Resp B/P (MAP) Pulse Ox O2 Delivery O2 Flow Rate FiO2


 


7/1/20 16:00 97.7 85 18 126/78 (94) 99   


 


7/1/20 15:19  85      


 


7/1/20 12:00 97.9 82 20 133/76 (95) 98   


 


7/1/20 11:29  83      


 


7/1/20 09:11    132/72    


 


7/1/20 09:00      Room Air  


 


7/1/20 08:00 97.9 78 18 132/72 (92) 99   


 


7/1/20 07:51  75      


 


7/1/20 04:54 97.6       


 


7/1/20 04:00 97.3 74 16 157/79 (105) 98   


 


7/1/20 04:00  73      


 


7/1/20 00:00  90      


 


7/1/20 00:00 97.1 80 19 164/76 (105) 97   


 


6/30/20 21:00      Room Air  


 


6/30/20 20:00 96.8 79 16 154/72 (99) 98   


 


6/30/20 20:00  82      








General Appearance:  no apparent distress, alert


Neck:  supple


Cardiovascular:  normal rate, regular rhythm


Respiratory/Chest:  lungs clear


Abdomen:  non tender, soft


Extremities:  no swelling











Intake and Output  


 


 6/30/20 7/1/20





 19:00 07:00


 


Intake Total 600 ml 240 ml


 


Output Total  500 ml


 


Balance 600 ml -260 ml


 


  


 


Intake Oral 600 ml 240 ml


 


Output Urine Total  500 ml


 


# Voids 5 1











Laboratory Tests








Test


  6/30/20


20:24 7/1/20


05:05 7/1/20


05:42 7/1/20


16:20


 


POC Whole Blood Glucose


  321 MG/DL


()  H 


  161 MG/DL


()  H 


 


 


Troponin I


  


  0.158 ng/mL


(0.000-0.056) 


  


 


 


Erythrocyte Sedimentation Rate


  


  


  


  45 MM/HR


(0-30)  H


 


Blood Urea Nitrogen


  


  


  


  13 mg/dL


(7-18)


 


Creatinine


  


  


  


  0.9 MG/DL


(0.55-1.30)


 


Estimat Glomerular Filtration


Rate 


  


  


  > 60 mL/min


(>60)


 


Total Creatine Kinase


  


  


  


  73 U/L


()


 


C-Reactive Protein,


Quantitative 


  


  


  2.4 mg/dL


(0.00-0.90)  H


 


Vitamin B12 Level


  


  


  


  703 PG/ML


(193-986)


 


Methylmalonic Acid    Pending  


 


Rapid Plasma Reagin    Pending  


 


Treponema pallidum Ab


(FTA-ABS) 


  


  


  Pending  


 

















Santo Sebastian MD Jul 1, 2020 19:32

## 2020-07-01 NOTE — NUR
CASE MANAGEMENT:REVIEW



7/1/20

SI: CHEST PAIN. 

LE PAIN, RADIATING TO BACK...R/O SPINAL STENOSIS

97.9   78  18  132/72  99% ON RA

TROPONIN(+) 0.158



IS: ASA PO QD

VASOTEC PO QD

LOVENOX SQ Q24

BACLOFEN PO BID

SS INSULIN AC+HS

IV MORPHINE Q4HRS

**: TELEMETRY STATUS

DCP: FROM HOME





PLAN:

2DECHO PENDING

## 2020-07-01 NOTE — DIAGNOSTIC IMAGING REPORT
Indication: Bilateral lower extremity weakness and pain. Back pain. Body spasms 

 

Technique: Sagittal T1 fast spin echo, sagittal T2 fast echo, sagittal STIR, axial T2

fast spin echo images were obtained through the thoracic spine

 

Comparison: 6/11/2011

 

Findings:There is mild depression of the superior endplate of T12, unchanged.

Vertebral body heights are preserved otherwise. Vertebral marrow signal is normal

with the exception of mild Modic type II degenerative changes of the mid thoracic

spine. Intrinsic cord signal is normal. There is mild multilevel degenerative disc

narrowing.

 

At T9-T10, there is right paracentral and subarticular disc protrusion which may

slightly compromise right lateral recess.

 

At T7-8 and T8-9, there is bilateral paracentral disc protrusion which does not

appear to significantly narrow the spinal canal or lateral recesses.

 

At the remaining levels, no significant disc bulge or protrusion, spinal stenosis, or

neural foraminal stenosis.

 

When compared to the prior study, degenerative changes have progressed slightly. 

 

Impression:  No acute abnormality

 

Minimal degenerative changes, as detailed above

## 2020-07-01 NOTE — NUR
NURSE NOTES:

Received report from DIANA Wheatley. Pt awake, A/O x4, able to make needs known. Pt breathing 
even and unlabored in RA, no s/sx of acute distress. IV site on R AC patent and 
asymptomatic. Bed on lowest position, call light within reach. Will continue plan of care.

## 2020-07-02 VITALS — SYSTOLIC BLOOD PRESSURE: 147 MMHG | DIASTOLIC BLOOD PRESSURE: 73 MMHG

## 2020-07-02 VITALS — SYSTOLIC BLOOD PRESSURE: 148 MMHG | DIASTOLIC BLOOD PRESSURE: 82 MMHG

## 2020-07-02 VITALS — SYSTOLIC BLOOD PRESSURE: 165 MMHG | DIASTOLIC BLOOD PRESSURE: 70 MMHG

## 2020-07-02 VITALS — SYSTOLIC BLOOD PRESSURE: 160 MMHG | DIASTOLIC BLOOD PRESSURE: 85 MMHG

## 2020-07-02 VITALS — DIASTOLIC BLOOD PRESSURE: 73 MMHG | SYSTOLIC BLOOD PRESSURE: 161 MMHG

## 2020-07-02 VITALS — DIASTOLIC BLOOD PRESSURE: 75 MMHG | SYSTOLIC BLOOD PRESSURE: 133 MMHG

## 2020-07-02 VITALS — DIASTOLIC BLOOD PRESSURE: 73 MMHG | SYSTOLIC BLOOD PRESSURE: 157 MMHG

## 2020-07-02 VITALS — DIASTOLIC BLOOD PRESSURE: 71 MMHG | SYSTOLIC BLOOD PRESSURE: 154 MMHG

## 2020-07-02 LAB
ALBUMIN SERPL-MCNC: 3.6 G/DL (ref 3.4–5)
ALBUMIN/GLOB SERPL: 0.9 {RATIO} (ref 1–2.7)
ALP SERPL-CCNC: 64 U/L (ref 46–116)
ALT SERPL-CCNC: 27 U/L (ref 12–78)
ANION GAP SERPL CALC-SCNC: 14 MMOL/L (ref 5–15)
AST SERPL-CCNC: 25 U/L (ref 15–37)
BILIRUB SERPL-MCNC: 0.7 MG/DL (ref 0.2–1)
BUN SERPL-MCNC: 9 MG/DL (ref 7–18)
CALCIUM SERPL-MCNC: 9.1 MG/DL (ref 8.5–10.1)
CHLORIDE SERPL-SCNC: 101 MMOL/L (ref 98–107)
CO2 SERPL-SCNC: 23 MMOL/L (ref 21–32)
CREAT SERPL-MCNC: 0.8 MG/DL (ref 0.55–1.3)
GLOBULIN SER-MCNC: 4.2 G/DL
POTASSIUM SERPL-SCNC: 4.6 MMOL/L (ref 3.5–5.1)
SODIUM SERPL-SCNC: 138 MMOL/L (ref 136–145)

## 2020-07-02 RX ADMIN — MORPHINE SULFATE PRN MG: 4 INJECTION INTRAVENOUS at 02:19

## 2020-07-02 RX ADMIN — INSULIN ASPART SCH UNITS: 100 INJECTION, SOLUTION INTRAVENOUS; SUBCUTANEOUS at 12:13

## 2020-07-02 RX ADMIN — ASPIRIN 81 MG SCH MG: 81 TABLET ORAL at 08:13

## 2020-07-02 RX ADMIN — METHYLPREDNISOLONE SCH MG: 4 TABLET ORAL at 06:00

## 2020-07-02 RX ADMIN — INSULIN ASPART SCH UNITS: 100 INJECTION, SOLUTION INTRAVENOUS; SUBCUTANEOUS at 06:08

## 2020-07-02 RX ADMIN — METHYLPREDNISOLONE SCH MG: 4 TABLET ORAL at 11:12

## 2020-07-02 RX ADMIN — METHYLPREDNISOLONE SCH MG: 4 TABLET ORAL at 21:01

## 2020-07-02 RX ADMIN — INSULIN ASPART SCH UNITS: 100 INJECTION, SOLUTION INTRAVENOUS; SUBCUTANEOUS at 16:43

## 2020-07-02 RX ADMIN — INSULIN ASPART SCH UNITS: 100 INJECTION, SOLUTION INTRAVENOUS; SUBCUTANEOUS at 21:16

## 2020-07-02 RX ADMIN — METHYLPREDNISOLONE SCH MG: 4 TABLET ORAL at 16:14

## 2020-07-02 RX ADMIN — CYCLOBENZAPRINE HYDROCHLORIDE SCH MG: 10 TABLET, FILM COATED ORAL at 08:13

## 2020-07-02 RX ADMIN — MORPHINE SULFATE PRN MG: 4 INJECTION INTRAVENOUS at 06:04

## 2020-07-02 RX ADMIN — MORPHINE SULFATE PRN MG: 4 INJECTION INTRAVENOUS at 16:15

## 2020-07-02 RX ADMIN — ENALAPRIL MALEATE SCH MG: 5 TABLET ORAL at 08:13

## 2020-07-02 RX ADMIN — CYCLOBENZAPRINE HYDROCHLORIDE SCH MG: 10 TABLET, FILM COATED ORAL at 18:01

## 2020-07-02 RX ADMIN — MORPHINE SULFATE PRN MG: 4 INJECTION INTRAVENOUS at 11:13

## 2020-07-02 RX ADMIN — MORPHINE SULFATE PRN MG: 2 INJECTION, SOLUTION INTRAMUSCULAR; INTRAVENOUS at 21:53

## 2020-07-02 RX ADMIN — ENOXAPARIN SODIUM SCH MG: 40 INJECTION SUBCUTANEOUS at 08:18

## 2020-07-02 RX ADMIN — HYDRALAZINE HYDROCHLORIDE PRN MG: 25 TABLET ORAL at 06:05

## 2020-07-02 RX ADMIN — CYCLOBENZAPRINE HYDROCHLORIDE SCH MG: 10 TABLET, FILM COATED ORAL at 12:10

## 2020-07-02 NOTE — NUR
NURSE NOTES:

Patient's blood pressure was 194/87 PRN Hydralazine 25mg PO given. Rechecked blood pressure 
and was still relatively high on all four extremities at high 160s. Notified Dr. Munroe 
and ordered Amlodipine 5mg now and q daily. Orders entered and carried out. Will rechech 
blood pressure

## 2020-07-02 NOTE — NUR
NURSE NOTES:

Received troponin level 0.167 slightly increased from 0.158 will inform Dr. Sebastian.

## 2020-07-02 NOTE — CARDIOLOGY PROGRESS NOTE
Assessment/Plan


Assessment/Plan


1. Left chest wall pain.


2. Leg cramps.


3. Diabetes mellitus.


4. Hypertension.


5. Diverticulosis.


6. Cervical spine and lumbar spine radiculopathy








still with tenderness / pain 


trop relatively flat will repeat level tonite en in am 


duplex neg 


spin report noted 


echo reviewed personally wall motion is normal 


ekg form lat nite was erroneous was repeated with a different machine and was 

normal 


left rib seiez ed and sternal sereis to r/o bony lesion





Subjective


Cardiovascular:  Reports: chest pain; Denies: lightheadedness


Respiratory:  Denies: shortness of breath


Genitourinary:  Denies: burning


Subjective


pain present all the tiem tnder to touch and with movmen of the left arm has 

pain in barrie shoudler , requesting more pain med





Objective





Last 24 Hour Vital Signs








  Date Time  Temp Pulse Resp B/P (MAP) Pulse Ox O2 Delivery O2 Flow Rate FiO2


 


7/2/20 16:00  92      


 


7/2/20 16:00 97.5 86 20 160/85 (110) 99   


 


7/2/20 12:00  87      


 


7/2/20 11:31 98.9 89 19 154/71 (98) 96   


 


7/2/20 09:00      Room Air  


 


7/2/20 08:13    147/73    


 


7/2/20 08:00  91      


 


7/2/20 07:59 98.6 8 18 147/73 (97) 96   


 


7/2/20 06:49    133/75 (94)    


 


7/2/20 06:05    161/76    


 


7/2/20 06:00    161/73 (102)    


 


7/2/20 04:00 97.9 96 20 165/70 (101) 96   


 


7/2/20 04:00  85      


 


7/2/20 02:49 98.9       


 


7/2/20 02:20  105  194/98    


 


7/2/20 00:00 98.9 115 20 157/73 (101) 97   


 


7/2/20 00:00  86      


 


7/1/20 23:58    194/87    


 


7/1/20 21:00      Room Air  


 


7/1/20 20:00  77      


 


7/1/20 20:00 98.3 113 20 167/81 (109) 97   








General Appearance:  no apparent distress, alert


Neck:  supple


Cardiovascular:  normal rate


Respiratory/Chest:  lungs clear


Abdomen:  normal bowel sounds, non tender, soft


Extremities:  no swelling











Intake and Output  


 


 7/1/20 7/2/20





 19:00 07:00


 


Intake Total 360 ml 


 


Output Total 600 ml 


 


Balance -240 ml 


 


  


 


Intake Oral 360 ml 


 


Output Urine Total 600 ml 


 


# Voids  2











Laboratory Tests








Test


  7/2/20


05:44 7/2/20


11:11 7/2/20


16:11


 


Sodium Level


  138 MMOL/L


(136-145) 


  


 


 


Potassium Level


  4.6 MMOL/L


(3.5-5.1) 


  


 


 


Chloride Level


  101 MMOL/L


() 


  


 


 


Carbon Dioxide Level


  23 MMOL/L


(21-32) 


  


 


 


Anion Gap


  14 mmol/L


(5-15) 


  


 


 


Blood Urea Nitrogen


  9 mg/dL (7-18)


  


  


 


 


Creatinine


  0.8 MG/DL


(0.55-1.30) 


  


 


 


Estimat Glomerular Filtration


Rate > 60 mL/min


(>60) 


  


 


 


Glucose Level


  276 MG/DL


()  H 


  


 


 


Calcium Level


  9.1 MG/DL


(8.5-10.1) 


  


 


 


Total Bilirubin


  0.7 MG/DL


(0.2-1.0) 


  


 


 


Aspartate Amino Transf


(AST/SGOT) 25 U/L (15-37)


  


  


 


 


Alanine Aminotransferase


(ALT/SGPT) 27 U/L (12-78)


  


  


 


 


Alkaline Phosphatase


  64 U/L


() 


  


 


 


Troponin I


  0.167 ng/mL


(0.000-0.056) 


  


 


 


Total Protein


  7.8 G/DL


(6.4-8.2) 


  


 


 


Albumin


  3.6 G/DL


(3.4-5.0) 


  


 


 


Globulin 4.2 g/dL    


 


Albumin/Globulin Ratio


  0.9 (1.0-2.7)


L 


  


 


 


POC Whole Blood Glucose  Pending   Pending  

















Santo Sebastian MD Jul 2, 2020 18:52

## 2020-07-02 NOTE — NUR
*-*DISCHARGE PLANNED*-*



PATIENT HAS BEEN ACCEPTED AND WILL BE DISCHARGED BACK TO: 



[facility]

 P: [phone] FOR NURSE TO NURSE REPORT

ROOM# 233.SKILLED

[facility] SET FOR  S/W 

PLACE A CALL TO PATIENTS NEXT TO [facility], NO ANSWER LEFT VOICE MESSAGE.


-------------------------------------------------------------------------------

Addendum: 07/02/20 at 1715 by RONALD PÉREZ CM

-------------------------------------------------------------------------------

DISREGARD ABOVE NOTE

## 2020-07-02 NOTE — NUR
*-*DISCHARGE PLANNING*-*



PATIENT HAS BEEN ACCEPTED WITH:



[facility]

 P: [phone] S/W KAYLA, STATED WILL ACCEPT PATIENT UPON DISCHARGE

ROOM# 14.C SKILLED

~~~~~~~~~PATIENT IS REQUESTING TEST RESULT FROM THE DRKaylen , ~~~~~~~~~~~

## 2020-07-02 NOTE — DIAGNOSTIC IMAGING REPORT
Indication: Unexplained muscle spasms and tremors

 

Technique: sagittal T1 fast spin echo, axial T1 and T2 FLAIR PROPELLER, axial T2 FS

PROPELLER, T2* GRE, axial diffusion weighted images, post contrast axial and coronal

and sagittal T1 FLAIR PROPELLER images, sagittal T2 FLAIR. ADC and exponential ADC

maps generated

 

Comparison:

 

Findings: . No abnormal areas of restricted diffusion to suggest acute infarction. No

acute hemorrhage or edema. No mass effect nor midline shift. No abnormal contrast

enhancement. There is mild age-related prominence of the ventricles and extra-axial

CSF spaces. Vascular flow voids are preserved. There is some periventricular deep

white matter high T2 signal. Visualized orbits and sinuses are unremarkable. There is

minimal mastoid disease on the right..

 

Impression: Minimal age-related volume loss

 

Minimal periventricular deep white matter high T2 signal, most likely representing

chronic microvascular ischemic changes

 

Negative for acute intracranial bleed, mass effect, infarct, or contrast enhancing

lesion

## 2020-07-02 NOTE — INTERNAL MED PROGRESS NOTE
Subjective


Physician Name


Ketan Munroe


Attending Physician


Ketan Munroe MD





Current Medications








 Medications


  (Trade)  Dose


 Ordered  Sig/Toni


 Route


 PRN Reason  Start Time


 Stop Time Status Last Admin


Dose Admin


 


 Acetaminophen


  (Tylenol)  650 mg  Q4H  PRN


 ORAL


 Mild Pain (Pain Scale 1-3)  6/30/20 02:30


 7/30/20 02:29   


 


 


 Amlodipine


 Besylate


  (Norvasc)  5 mg  DAILY


 ORAL


   7/3/20 09:00


 8/2/20 08:59   


 


 


 Aspirin


  (ASA)  81 mg  DAILY


 NG


   6/30/20 12:30


 8/14/20 12:29  7/2/20 08:13


 


 


 Cyclobenzaprine


 HCl


  (Flexeril)  10 mg  THREE TIMES A  DAY


 ORAL


   7/1/20 22:15


 7/31/20 22:14  7/2/20 12:10


 


 


 Dextrose


  (Dextrose 50%)  25 ml  Q30M  PRN


 IV


 Hypoglycemia  6/30/20 04:30


 9/28/20 04:29   


 


 


 Dextrose


  (Dextrose 50%)  50 ml  Q30M  PRN


 IV


 Hypoglycemia  6/30/20 04:30


 9/28/20 04:29   


 


 


 Enalapril Maleate


  (Vasotec)  10 mg  DAILY


 ORAL


   6/30/20 12:15


 7/30/20 12:14  7/2/20 08:13


 


 


 Enoxaparin Sodium


  (Lovenox)  40 mg  Q24H


 SUBQ


   6/30/20 09:00


 9/28/20 08:59  7/2/20 08:18


 


 


 Gadobutrol


  (Gadavist)  7.5 mmol  NOW  PRN


 IV


 Radiology Procedure  7/1/20 15:30


 7/5/20 15:23   


 


 


 Hydralazine HCl


  (Apresoline)  25 mg  Q6H  PRN


 ORAL


 For High Blood Pressure  6/30/20 04:30


 9/28/20 04:29  7/2/20 06:05


 


 


 Insulin Aspart


  (NovoLOG)    BEFORE MEALS AND  HS


 SUBQ


   6/30/20 06:30


 9/28/20 06:29  7/2/20 12:13


 


 


 Methylprednisolone


  (Medrol)  4 mg  ACBREAKFAST


 ORAL


   7/2/20 06:30


 7/6/20 08:30  7/2/20 06:00


 


 


 Methylprednisolone


  (Medrol)  4 mg  BIDLS


 ORAL


   7/2/20 11:30


 7/2/20 18:00  7/2/20 11:12


 


 


 Methylprednisolone


  (Medrol)  4 mg  Q24H


 ORAL


   7/4/20 12:30


 7/4/20 13:00   


 


 


 Methylprednisolone


  (Medrol)  4 mg  QHS


 ORAL


   7/3/20 21:00


 7/5/20 22:00   


 


 


 Methylprednisolone


  (Medrol)  8 mg  QHS


 ORAL


   7/1/20 21:00


 7/2/20 22:00  7/1/20 21:09


 


 


 Morphine Sulfate


  (Morphine


 Sulfate)  1 mg  Q4H  PRN


 IVP


 Moderate Pain (Pain Scale 4-6)  6/30/20 02:30


 7/7/20 02:29   


 


 


 Morphine Sulfate


  (Morphine


 Sulfate)  4 mg  Q4H  PRN


 IVP


 Severe Pain (Pain Scale 7-10)  6/30/20 02:30


 7/7/20 02:29  7/2/20 11:13


 


 


 Ondansetron HCl


  (Zofran)  4 mg  Q6H  PRN


 IVP


 Nausea & Vomiting  6/30/20 02:30


 7/30/20 02:29   


 


 


 Polyethylene


 Glycol


  (Miralax)  17 gm  HSPRN  PRN


 ORAL


 Constipation  6/30/20 02:30


 7/30/20 02:29   


 








Allergies:  


Coded Allergies:  


     No Known Allergies (Verified , 6/7/11)


All Systems:  reviewed and negative except above - leg spasms





Objective





Last Vital Signs








  Date Time  Temp Pulse Resp B/P (MAP) Pulse Ox O2 Delivery O2 Flow Rate FiO2


 


7/2/20 12:00  87      


 


7/2/20 11:31 98.9  19 154/71 (98) 96   


 


7/2/20 09:00      Room Air  











Laboratory Tests








Test


  7/1/20


16:20 7/2/20


05:44 7/2/20


11:11


 


Erythrocyte Sedimentation Rate


  45 MM/HR


(0-30)  H 


  


 


 


Blood Urea Nitrogen


  13 mg/dL


(7-18) 


  


 


 


Creatinine


  0.9 MG/DL


(0.55-1.30) 


  


 


 


Estimat Glomerular Filtration


Rate > 60 mL/min


(>60) 


  


 


 


Total Creatine Kinase


  73 U/L


() 


  


 


 


C-Reactive Protein,


Quantitative 2.4 mg/dL


(0.00-0.90)  H 


  


 


 


Vitamin B12 Level


  703 PG/ML


(193-986) 


  


 


 


Methylmalonic Acid Pending    


 


Rapid Plasma Reagin


  Non reactive


(Non Reactive) 


  


 


 


Treponema pallidum Ab


(FTA-ABS) Pending  


  


  


 


 


Troponin I


  


  0.167 ng/mL


(0.000-0.056) 


 


 


POC Whole Blood Glucose   Pending  

















Intake and Output  


 


 7/1/20 7/2/20





 19:00 07:00


 


Intake Total 360 ml 


 


Output Total 600 ml 


 


Balance -240 ml 


 


  


 


Intake Oral 360 ml 


 


Output Urine Total 600 ml 


 


# Voids  2








Objective


GENERAL:  No acute distress.  The patient is alert, awake, and oriented


x3.


HEENT:  Normocephalic/atraumatic.


NECK:  Supple.  No JVD.


LUNGS:  Clear to auscultation bilaterally.  No crackles, rhonchi, or


rales.


CARDIOVASCULAR:  Regular rate and rhythm.  Normal S1, S2.


ABDOMEN:  Soft, nontender, and nondistended.


EXTREMITIES:  No clubbing, cyanosis, or edema.


NEUROLOGIC:  Motor, the patient can lift the right lower leg 4/5 and the


left lower leg 3/5.  Cranial nerves II through XII intact.





Assessment/Plan


Assessment/Plan





ASSESSMENT:


1. Lumbar pain 2/2 severe left and moderate right foraminal narrowing with 

abutment of the exiting left L5 nerve root





2. Chest pain with intermediate troponin.


3. Diabetes.


4. Hypertension.


5. History of trigeminal neuralgia.


6. Hemochromatosis.





PLAN:


1.  telemetry.


2. Cardiology consult.


3. Neurology consult.


4. Flexeril 10 mg p.o. TID


5. MRI of spine (C/T/L) - shows  severe left and moderate right foraminal 

narrowing with abutment of the exiting left L5 nerve root


6. awaiting PT evaluation --> if can walk then dc home tomorrow 


7. cw aspirin.


8. Full code.


9. DVT prophylaxis given.


10. Neurosurgery evaluation 


11. trial of decadron


 





DR VALADEZ TO COVER 7-3 to 7-6











Ketan Munroe MD Jul 2, 2020 13:08

## 2020-07-02 NOTE — NUR
NURSE NOTES:

Received patient in bed. Awake, A/O x4. Patient has 6/10 pain, pain medication previously 
given. On room air. Iv in the Right AC, site intact.  Purewick in place. Bed low and locked, 
side rails up x2, sitting in high -fowlers.

## 2020-07-02 NOTE — NUR
P.T Note:

P.T evaluation completed and tx initiated. Please refer to P.T evaluation for current 
functional status. Pt is alert, O x 4. Pleasant and cooperative. Pt has limited 
participation in functional activity secondary to pain and spasms in the lower back aggravte

-------------------------------------------------------------------------------

Addendum: 07/02/20 at 1025 by JEISON RAND PT

-------------------------------------------------------------------------------

P.T Note:

P.T evaluation completed and tx initiated. Please refer to P.T evaluation for current 
functional status. Pt is alert, O x 4. Pleasant and cooperative. Pt has limited 
participation in functional activity secondary to pain and spasms in the lower back   
extending to bilateral thighs, lower legs and ankles aggravated by movement initiation and 
certain positioning. Pt also presented LOM on the L shoulder and asymmetrical weakness on 
LLE. Pt currently required extended time and MOD A X 1 to initiate and complete supine 
to/from sitting and sitting to/from standing using the FWW. Pt was able to stand using the 
FWW and was only able to take 2-3 small steps with MOD A X 1. Pt is highly motivated to get 
better and would benefit from skilled P.T services to improve her strength , balance and 
endurance to increase mobility independence and safety.  Recommend SNF for short term rehab 
intervention VS Home [facility]. Thank you for this referral.

## 2020-07-02 NOTE — NUR
NOTE



CM MET WITH PATIENT AT BEDSIDE. SON CONG AT PATIENTS BEDSIDE. PATIENT AND SON INFORMED OF 
RECOMMENDATION BY MD FOR SNF PLACEMENT FOR PT REHAB. BOTH PATIENT AND SON NOT IN AGREEMENT 
TO SNF PLACEMENT. PATIENT INFORMED CM THAT SHE CURRENTLY DOES NOT HAVE A CAREGIVER AT HOME 
AND HER CHILDREN ALL WORK.  PATIENT STATES SHE WAS PREVIOUSLY INPATIENT AT A SNF FOR 5 
MONTHS AND HAD A BAD EXPERIENCE AND IS RELUCTANT FOR PLACEMENT AT THIS TIME. PATIENT 
EDUCATED ON RISKS AND BENEFITS OF SNF. 

BOTH PATIENT AND SON STATE THEY MAY AGREE TO SNF WHEN CM HAS SNF INFO AND THEY CAN DECIDE AS 
A FAMILY.

## 2020-07-02 NOTE — NUR
NURSE NOTES:

Received report from Ab/Ashwini RN. Patient is on bed, alert, awake and oriented x 4. On 
consistent card (Low), instructed and amenable. On room air with no desaturation reported. 
Cardiac monitor is in placed, shows sinus rhythm and no chest pain at this time. IV site is 
on right AC G-20 saline lock that is patent and intact. On fall precaution, Safety measurers 
are in placed, bed in lowest and locked position, call light button and bedside table is 
within reach, instructed to call for any assistance needed. Will continue plan of care.

## 2020-07-03 VITALS — SYSTOLIC BLOOD PRESSURE: 151 MMHG | DIASTOLIC BLOOD PRESSURE: 70 MMHG

## 2020-07-03 VITALS — SYSTOLIC BLOOD PRESSURE: 166 MMHG | DIASTOLIC BLOOD PRESSURE: 73 MMHG

## 2020-07-03 VITALS — SYSTOLIC BLOOD PRESSURE: 155 MMHG | DIASTOLIC BLOOD PRESSURE: 79 MMHG

## 2020-07-03 VITALS — DIASTOLIC BLOOD PRESSURE: 51 MMHG | SYSTOLIC BLOOD PRESSURE: 161 MMHG

## 2020-07-03 VITALS — DIASTOLIC BLOOD PRESSURE: 71 MMHG | SYSTOLIC BLOOD PRESSURE: 147 MMHG

## 2020-07-03 VITALS — DIASTOLIC BLOOD PRESSURE: 78 MMHG | SYSTOLIC BLOOD PRESSURE: 147 MMHG

## 2020-07-03 LAB
ANION GAP SERPL CALC-SCNC: 10 MMOL/L (ref 5–15)
BUN SERPL-MCNC: 17 MG/DL (ref 7–18)
CALCIUM SERPL-MCNC: 9.1 MG/DL (ref 8.5–10.1)
CHLORIDE SERPL-SCNC: 100 MMOL/L (ref 98–107)
CO2 SERPL-SCNC: 25 MMOL/L (ref 21–32)
CREAT SERPL-MCNC: 1 MG/DL (ref 0.55–1.3)
POTASSIUM SERPL-SCNC: 4.6 MMOL/L (ref 3.5–5.1)
SODIUM SERPL-SCNC: 135 MMOL/L (ref 136–145)

## 2020-07-03 RX ADMIN — INSULIN ASPART SCH UNITS: 100 INJECTION, SOLUTION INTRAVENOUS; SUBCUTANEOUS at 20:35

## 2020-07-03 RX ADMIN — CYCLOBENZAPRINE HYDROCHLORIDE SCH MG: 10 TABLET, FILM COATED ORAL at 17:10

## 2020-07-03 RX ADMIN — INSULIN ASPART SCH UNITS: 100 INJECTION, SOLUTION INTRAVENOUS; SUBCUTANEOUS at 06:04

## 2020-07-03 RX ADMIN — ENOXAPARIN SODIUM SCH MG: 40 INJECTION SUBCUTANEOUS at 08:55

## 2020-07-03 RX ADMIN — CYCLOBENZAPRINE HYDROCHLORIDE SCH MG: 10 TABLET, FILM COATED ORAL at 08:47

## 2020-07-03 RX ADMIN — MORPHINE SULFATE PRN MG: 4 INJECTION INTRAVENOUS at 05:03

## 2020-07-03 RX ADMIN — ENALAPRIL MALEATE SCH MG: 5 TABLET ORAL at 08:47

## 2020-07-03 RX ADMIN — HYDRALAZINE HYDROCHLORIDE PRN MG: 25 TABLET ORAL at 05:04

## 2020-07-03 RX ADMIN — INSULIN ASPART SCH UNITS: 100 INJECTION, SOLUTION INTRAVENOUS; SUBCUTANEOUS at 11:30

## 2020-07-03 RX ADMIN — INSULIN ASPART SCH UNITS: 100 INJECTION, SOLUTION INTRAVENOUS; SUBCUTANEOUS at 16:30

## 2020-07-03 RX ADMIN — MORPHINE SULFATE PRN MG: 2 INJECTION, SOLUTION INTRAMUSCULAR; INTRAVENOUS at 01:58

## 2020-07-03 RX ADMIN — MORPHINE SULFATE PRN MG: 4 INJECTION INTRAVENOUS at 20:22

## 2020-07-03 RX ADMIN — MORPHINE SULFATE PRN MG: 4 INJECTION INTRAVENOUS at 13:11

## 2020-07-03 RX ADMIN — METHYLPREDNISOLONE SCH MG: 4 TABLET ORAL at 06:06

## 2020-07-03 RX ADMIN — ASPIRIN 81 MG SCH MG: 81 TABLET ORAL at 08:47

## 2020-07-03 RX ADMIN — CYCLOBENZAPRINE HYDROCHLORIDE SCH MG: 10 TABLET, FILM COATED ORAL at 13:10

## 2020-07-03 NOTE — INTERNAL MED PROGRESS NOTE
Subjective


Physician Name


Vin Armstrong


Attending Physician


Vin Armstrong MD





Current Medications








 Medications


  (Trade)  Dose


 Ordered  Sig/Toni


 Route


 PRN Reason  Start Time


 Stop Time Status Last Admin


Dose Admin


 


 Acetaminophen


  (Tylenol)  650 mg  Q4H  PRN


 ORAL


 Mild Pain (Pain Scale 1-3)  6/30/20 02:30


 7/30/20 02:29   


 


 


 Amlodipine


 Besylate


  (Norvasc)  5 mg  DAILY


 ORAL


   7/3/20 09:00


 8/2/20 08:59  7/3/20 08:47


 


 


 Aspirin


  (ASA)  81 mg  DAILY


 NG


   6/30/20 12:30


 8/14/20 12:29  7/3/20 08:47


 


 


 Cyclobenzaprine


 HCl


  (Flexeril)  10 mg  THREE TIMES A  DAY


 ORAL


   7/1/20 22:15


 7/31/20 22:14  7/3/20 13:10


 


 


 Dextrose


  (Dextrose 50%)  25 ml  Q30M  PRN


 IV


 Hypoglycemia  6/30/20 04:30


 9/28/20 04:29   


 


 


 Dextrose


  (Dextrose 50%)  50 ml  Q30M  PRN


 IV


 Hypoglycemia  6/30/20 04:30


 9/28/20 04:29   


 


 


 Enalapril Maleate


  (Vasotec)  10 mg  DAILY


 ORAL


   6/30/20 12:15


 7/30/20 12:14  7/3/20 08:47


 


 


 Enoxaparin Sodium


  (Lovenox)  40 mg  Q24H


 SUBQ


   6/30/20 09:00


 9/28/20 08:59  7/3/20 08:55


 


 


 Gadobutrol


  (Gadavist)  7.5 mmol  NOW  PRN


 IV


 Radiology Procedure  7/1/20 15:30


 7/5/20 15:23   


 


 


 Hydralazine HCl


  (Apresoline)  25 mg  Q6H  PRN


 ORAL


 For High Blood Pressure  6/30/20 04:30


 9/28/20 04:29  7/3/20 05:04


 


 


 Insulin Aspart


  (NovoLOG)    BEFORE MEALS AND  HS


 SUBQ


   6/30/20 06:30


 9/28/20 06:29  7/3/20 06:04


 


 


 Methylprednisolone


  (Medrol)  4 mg  ACBREAKFAST


 ORAL


   7/2/20 06:30


 7/6/20 08:30  7/3/20 06:06


 


 


 Methylprednisolone


  (Medrol)  4 mg  Q24H


 ORAL


   7/4/20 12:30


 7/4/20 13:00   


 


 


 Methylprednisolone


  (Medrol)  4 mg  QHS


 ORAL


   7/3/20 21:00


 7/5/20 22:00   


 


 


 Morphine Sulfate


  (Morphine


 Sulfate)  1 mg  Q4H  PRN


 IVP


 Moderate Pain (Pain Scale 4-6)  6/30/20 02:30


 7/7/20 02:29  7/3/20 01:58


 


 


 Morphine Sulfate


  (Morphine


 Sulfate)  4 mg  Q4H  PRN


 IVP


 Severe Pain (Pain Scale 7-10)  6/30/20 02:30


 7/7/20 02:29  7/3/20 13:11


 


 


 Ondansetron HCl


  (Zofran)  4 mg  Q6H  PRN


 IVP


 Nausea & Vomiting  6/30/20 02:30


 7/30/20 02:29   


 


 


 Polyethylene


 Glycol


  (Miralax)  17 gm  HSPRN  PRN


 ORAL


 Constipation  6/30/20 02:30


 7/30/20 02:29   


 








Allergies:  


Coded Allergies:  


     No Known Allergies (Verified , 6/7/11)


Subjective


awake, alert, rersponsive, No CP or SOB, C/O lower extremities weakness and 

unable to ambulate.





Objective





Last Vital Signs








  Date Time  Temp Pulse Resp B/P (MAP) Pulse Ox O2 Delivery O2 Flow Rate FiO2


 


7/3/20 13:50 97.5       


 


7/3/20 09:00      Room Air  


 


7/3/20 08:47  90  166/73    


 


7/3/20 08:00   20  99   











Laboratory Tests








Test


  7/2/20


16:11 7/3/20


05:00


 


POC Whole Blood Glucose Pending   


 


Sodium Level


  


  135 MMOL/L


(136-145)  L


 


Potassium Level


  


  4.6 MMOL/L


(3.5-5.1)


 


Chloride Level


  


  100 MMOL/L


()


 


Carbon Dioxide Level


  


  25 MMOL/L


(21-32)


 


Anion Gap


  


  10 mmol/L


(5-15)


 


Blood Urea Nitrogen


  


  17 mg/dL


(7-18)


 


Creatinine


  


  1.0 MG/DL


(0.55-1.30)


 


Estimat Glomerular Filtration


Rate 


  > 60 mL/min


(>60)


 


Glucose Level


  


  374 MG/DL


()  H


 


Calcium Level


  


  9.1 MG/DL


(8.5-10.1)


 


Magnesium Level


  


  2.0 MG/DL


(1.8-2.4)

















Intake and Output  


 


 7/2/20 7/3/20





 19:00 07:00


 


Intake Total 140 ml 400 ml


 


Output Total 1200 ml 980 ml


 


Balance -1060 ml -580 ml


 


  


 


Intake Oral 140 ml 400 ml


 


Output Urine Total 1200 ml 980 ml


 


# Voids 3 








Objective


General: No acute distress, awake and alert


HEENT: NCAT, sclera anicteric, PERRL, EOMI.


Neck: Supple, no significant jugular venous distention, 


Lungs: Fair inspiratory effort, clear to auscultation bilaterally, no Wheeze or 

Rales.


Heart: Regular rate and rhythm, normal S1/S2, no murmurs, distant heart sound.


Abdomen: soft, nontender, nondistended. Normoactive bowel sounds, Morbid 

obesity.


Extremities: No Cyanosis , clubbing or edema. 


Neuro: A&O x 3, Able to move all extremities, Left side weaker than right side.


Psych: Normal mood and affect





Assessment/Plan


Assessment/Plan


1. Lumbar pain 2/2 severe left and moderate right foraminal narrowing with 

abutment of the exiting left L5 nerve root


2. Chest pain with intermediate troponin.


3. Diabetes.


4. Hypertension.


5. History of trigeminal neuralgia.


6. Hemochromatosis.





PLAN:


1. In Telemetry.


2. Cardiology consult.


3. Neurology consult.


4. Flexeril 10 mg p.o. TID


5. MRI of spine (C/T/L) - shows  severe left and moderate right foraminal 

narrowing with abutment of the exiting left L5 nerve root


6. PT evaluation 


7. On aspirin.


8. Full code.


9. DVT prophylaxis: Lovenox SQ


10. trial of Decadron taper











Vin Armstrong MD Jul 3, 2020 14:02

## 2020-07-03 NOTE — DIAGNOSTIC IMAGING REPORT
EXAM:

  XR Left Ribs, 2 Views

 

CLINICAL HISTORY:

  PAIN

 

TECHNIQUE:

  Frontal and oblique views of the left ribs.

 

COMPARISON:

  No relevant prior studies available.

 

FINDINGS:

  Lungs:  Unremarkable as visualized.  No consolidation.

  Pleural space: No pleural effusions or pneumothorax.

  Bones/joints: Generalized osteopenia.  No acute fracture.

 

IMPRESSION:     

 

No radiographic evidence of acute fracture.

## 2020-07-03 NOTE — NUR
CASE MANAGEMENT: REVIEW 



7/3/2020



SI:Left chest wall pain.

VS: T 97.5 HR 90 RR 20 B/P 166/73 SATS 99% ON RA 

LABS;   



IS:MEDROL PO QAM 

VASOTEC PO QD

ASA NG QD 

NORVASC PO QD

INSULIN ASPART SUBQ AC/HS 



**TELE*** 



PLAN OF CARE: 

adjust cardiac med

symptomatic treatment

## 2020-07-03 NOTE — NUR
HAND-OFF: 

Report given to DIANA Berger. Patient is awake, on bed. On stable condition, with no complaints 
made at this time. Plan of care endorsed.

## 2020-07-03 NOTE — NUR
NURSE NOTES:

Received report from Ab ORDAZ RN. Pt in bed, alert, denies pain at this time. Call light 
within reach with instructions to use if assistance is needed, pt verbalized understanding. 
Will continue plan of care and close monitoring.

## 2020-07-03 NOTE — DIAGNOSTIC IMAGING REPORT
EXAM:

  XR Sternum, 2 Views

 

CLINICAL HISTORY:

  PAIN

 

TECHNIQUE:

  Lateral and oblique views of the sternum.

 

COMPARISON:

  No relevant prior studies available.

 

FINDINGS:

  Bones/joints:  Unremarkable.  No acute fracture.

  Soft tissues:  Unremarkable.

 

IMPRESSION:     

 

No definite abnormality in the visualized portions.  If there is point 

tenderness, CT exam may further evaluate.

## 2020-07-03 NOTE — CARDIOLOGY PROGRESS NOTE
Assessment/Plan


Assessment/Plan


1. Left chest wall pain.


2. Leg cramps.


3. Diabetes mellitus.


4. Hypertension.


5. Diverticulosis.


6. Cervical spine and lumbar spine radiculopathy








still with tenderness / pain 


trop relatively flat will repeat level tonite en in am 


duplex neg 


spin report noted 


echo reviewed personally wall motion is normal 


xrayof rib and sternum no fx





Subjective


Cardiovascular:  Reports: chest pain


Subjective


tenderness now better as of today





Objective





Last 24 Hour Vital Signs








  Date Time  Temp Pulse Resp B/P (MAP) Pulse Ox O2 Delivery O2 Flow Rate FiO2


 


7/3/20 09:00      Room Air  


 


7/3/20 08:47  90  166/73    


 


7/3/20 08:47    166/73    


 


7/3/20 08:00 97.5 90 20 166/73 (104) 99   


 


7/3/20 05:04    161/51    


 


7/3/20 04:00 98.9 83 20 161/51 (87) 95   


 


7/3/20 04:00  74      


 


7/3/20 00:00 98.9 86 18 151/70 (97) 98   


 


7/3/20 00:00  87      


 


7/2/20 21:00      Room Air  


 


7/2/20 20:00  99      


 


7/2/20 20:00 98.4 93 20 148/82 (104) 99   


 


7/2/20 16:00  92      


 


7/2/20 16:00 97.5 86 20 160/85 (110) 99   








General Appearance:  no apparent distress, obese, patient on isolation, 

isolation precautions











Intake and Output  


 


 7/2/20 7/3/20





 19:00 07:00


 


Intake Total 140 ml 400 ml


 


Output Total 1200 ml 980 ml


 


Balance -1060 ml -580 ml


 


  


 


Intake Oral 140 ml 400 ml


 


Output Urine Total 1200 ml 980 ml


 


# Voids 3 











Laboratory Tests








Test


  7/2/20


16:11 7/3/20


05:00


 


POC Whole Blood Glucose Pending   


 


Sodium Level


  


  135 MMOL/L


(136-145)  L


 


Potassium Level


  


  4.6 MMOL/L


(3.5-5.1)


 


Chloride Level


  


  100 MMOL/L


()


 


Carbon Dioxide Level


  


  25 MMOL/L


(21-32)


 


Anion Gap


  


  10 mmol/L


(5-15)


 


Blood Urea Nitrogen


  


  17 mg/dL


(7-18)


 


Creatinine


  


  1.0 MG/DL


(0.55-1.30)


 


Estimat Glomerular Filtration


Rate 


  > 60 mL/min


(>60)


 


Glucose Level


  


  374 MG/DL


()  H


 


Calcium Level


  


  9.1 MG/DL


(8.5-10.1)


 


Magnesium Level


  


  2.0 MG/DL


(1.8-2.4)

















Santo Sebastian MD Jul 3, 2020 13:18

## 2020-07-03 NOTE — PULMONOLOGY PROGRESS NOTE
Subjective


ROS Limited/Unobtainable:  No


Interval Events:  c/o cramps in legs


Allergies:  


Coded Allergies:  


     No Known Allergies (Verified , 6/7/11)


All Systems:  reviewed and negative except above - leg spasms





Objective





Last 24 Hour Vital Signs








  Date Time  Temp Pulse Resp B/P (MAP) Pulse Ox O2 Delivery O2 Flow Rate FiO2


 


7/3/20 09:00      Room Air  


 


7/3/20 08:47  90  166/73    


 


7/3/20 08:47    166/73    


 


7/3/20 08:00 97.5 90 20 166/73 (104) 99   


 


7/3/20 05:04    161/51    


 


7/3/20 04:00 98.9 83 20 161/51 (87) 95   


 


7/3/20 04:00  74      


 


7/3/20 00:00 98.9 86 18 151/70 (97) 98   


 


7/3/20 00:00  87      


 


7/2/20 21:00      Room Air  


 


7/2/20 20:00  99      


 


7/2/20 20:00 98.4 93 20 148/82 (104) 99   


 


7/2/20 16:00  92      


 


7/2/20 16:00 97.5 86 20 160/85 (110) 99   

















Intake and Output  


 


 7/2/20 7/3/20





 19:00 07:00


 


Intake Total 140 ml 400 ml


 


Output Total 1200 ml 980 ml


 


Balance -1060 ml -580 ml


 


  


 


Intake Oral 140 ml 400 ml


 


Output Urine Total 1200 ml 980 ml


 


# Voids 3 








General Appearance:  WD/WN


HEENT:  normocephalic, atraumatic


Respiratory:  chest wall non-tender, lungs clear


Breasts:  no masses


Cardiovascular:  normal rate


Abdomen:  normal bowel sounds, soft, non tender, no organomegaly, non distended


Genitourinary:  normal external genitalia


Extremities:  no cyanosis


Laboratory Tests


7/2/20 16:11: POC Whole Blood Glucose [Pending]


7/3/20 05:00: 


Sodium Level 135L, Potassium Level 4.6, Chloride Level 100, Carbon Dioxide 

Level 25, Anion Gap 10, Blood Urea Nitrogen 17, Creatinine 1.0, Estimat 

Glomerular Filtration Rate > 60, Glucose Level 374H, Calcium Level 9.1, 

Magnesium Level 2.0





Current Medications








 Medications


  (Trade)  Dose


 Ordered  Sig/Toni


 Route


 PRN Reason  Start Time


 Stop Time Status Last Admin


Dose Admin


 


 Acetaminophen


  (Tylenol)  650 mg  Q4H  PRN


 ORAL


 Mild Pain (Pain Scale 1-3)  6/30/20 02:30


 7/30/20 02:29   


 


 


 Amlodipine


 Besylate


  (Norvasc)  5 mg  DAILY


 ORAL


   7/3/20 09:00


 8/2/20 08:59  7/3/20 08:47


 


 


 Aspirin


  (ASA)  81 mg  DAILY


 NG


   6/30/20 12:30


 8/14/20 12:29  7/3/20 08:47


 


 


 Cyclobenzaprine


 HCl


  (Flexeril)  10 mg  THREE TIMES A  DAY


 ORAL


   7/1/20 22:15


 7/31/20 22:14  7/3/20 08:47


 


 


 Dextrose


  (Dextrose 50%)  25 ml  Q30M  PRN


 IV


 Hypoglycemia  6/30/20 04:30


 9/28/20 04:29   


 


 


 Dextrose


  (Dextrose 50%)  50 ml  Q30M  PRN


 IV


 Hypoglycemia  6/30/20 04:30


 9/28/20 04:29   


 


 


 Enalapril Maleate


  (Vasotec)  10 mg  DAILY


 ORAL


   6/30/20 12:15


 7/30/20 12:14  7/3/20 08:47


 


 


 Enoxaparin Sodium


  (Lovenox)  40 mg  Q24H


 SUBQ


   6/30/20 09:00


 9/28/20 08:59  7/3/20 08:55


 


 


 Gadobutrol


  (Gadavist)  7.5 mmol  NOW  PRN


 IV


 Radiology Procedure  7/1/20 15:30


 7/5/20 15:23   


 


 


 Hydralazine HCl


  (Apresoline)  25 mg  Q6H  PRN


 ORAL


 For High Blood Pressure  6/30/20 04:30


 9/28/20 04:29  7/3/20 05:04


 


 


 Insulin Aspart


  (NovoLOG)    BEFORE MEALS AND  HS


 SUBQ


   6/30/20 06:30


 9/28/20 06:29  7/3/20 06:04


 


 


 Methylprednisolone


  (Medrol)  4 mg  ACBREAKFAST


 ORAL


   7/2/20 06:30


 7/6/20 08:30  7/3/20 06:06


 


 


 Methylprednisolone


  (Medrol)  4 mg  Q24H


 ORAL


   7/4/20 12:30


 7/4/20 13:00   


 


 


 Methylprednisolone


  (Medrol)  4 mg  QHS


 ORAL


   7/3/20 21:00


 7/5/20 22:00   


 


 


 Morphine Sulfate


  (Morphine


 Sulfate)  1 mg  Q4H  PRN


 IVP


 Moderate Pain (Pain Scale 4-6)  6/30/20 02:30


 7/7/20 02:29  7/3/20 01:58


 


 


 Morphine Sulfate


  (Morphine


 Sulfate)  4 mg  Q4H  PRN


 IVP


 Severe Pain (Pain Scale 7-10)  6/30/20 02:30


 7/7/20 02:29  7/3/20 05:03


 


 


 Ondansetron HCl


  (Zofran)  4 mg  Q6H  PRN


 IVP


 Nausea & Vomiting  6/30/20 02:30


 7/30/20 02:29   


 


 


 Polyethylene


 Glycol


  (Miralax)  17 gm  HSPRN  PRN


 ORAL


 Constipation  6/30/20 02:30


 7/30/20 02:29   


 











Assessment/Plan


Problems:  


(1) ACS (acute coronary syndrome)


(2) History of diabetes mellitus


(3) History of TIAs


(4) History of hypertension


Assessment/Plan


all films reviewed, no rib fractures were seen .


Echo reviewed


BP remains borderline high


adjust cardiac med


symptomatic treatment


f/u cardiology recommendation. 


dvt prophylaxis


sliding scale











Sukhdeep Tavarez MD Jul 3, 2020 12:21

## 2020-07-04 VITALS — DIASTOLIC BLOOD PRESSURE: 72 MMHG | SYSTOLIC BLOOD PRESSURE: 146 MMHG

## 2020-07-04 VITALS — SYSTOLIC BLOOD PRESSURE: 156 MMHG | DIASTOLIC BLOOD PRESSURE: 80 MMHG

## 2020-07-04 VITALS — DIASTOLIC BLOOD PRESSURE: 75 MMHG | SYSTOLIC BLOOD PRESSURE: 136 MMHG

## 2020-07-04 VITALS — DIASTOLIC BLOOD PRESSURE: 83 MMHG | SYSTOLIC BLOOD PRESSURE: 146 MMHG

## 2020-07-04 VITALS — DIASTOLIC BLOOD PRESSURE: 67 MMHG | SYSTOLIC BLOOD PRESSURE: 126 MMHG

## 2020-07-04 VITALS — SYSTOLIC BLOOD PRESSURE: 137 MMHG | DIASTOLIC BLOOD PRESSURE: 76 MMHG

## 2020-07-04 RX ADMIN — INSULIN ASPART SCH UNITS: 100 INJECTION, SOLUTION INTRAVENOUS; SUBCUTANEOUS at 12:00

## 2020-07-04 RX ADMIN — INSULIN ASPART SCH UNITS: 100 INJECTION, SOLUTION INTRAVENOUS; SUBCUTANEOUS at 17:10

## 2020-07-04 RX ADMIN — INSULIN ASPART SCH UNITS: 100 INJECTION, SOLUTION INTRAVENOUS; SUBCUTANEOUS at 17:11

## 2020-07-04 RX ADMIN — ENOXAPARIN SODIUM SCH MG: 40 INJECTION SUBCUTANEOUS at 08:44

## 2020-07-04 RX ADMIN — CYCLOBENZAPRINE HYDROCHLORIDE SCH MG: 10 TABLET, FILM COATED ORAL at 08:41

## 2020-07-04 RX ADMIN — MORPHINE SULFATE PRN MG: 4 INJECTION INTRAVENOUS at 00:50

## 2020-07-04 RX ADMIN — MORPHINE SULFATE PRN MG: 4 INJECTION INTRAVENOUS at 23:41

## 2020-07-04 RX ADMIN — CYCLOBENZAPRINE HYDROCHLORIDE SCH MG: 10 TABLET, FILM COATED ORAL at 12:13

## 2020-07-04 RX ADMIN — INSULIN ASPART SCH UNITS: 100 INJECTION, SOLUTION INTRAVENOUS; SUBCUTANEOUS at 06:49

## 2020-07-04 RX ADMIN — INSULIN ASPART SCH UNITS: 100 INJECTION, SOLUTION INTRAVENOUS; SUBCUTANEOUS at 20:57

## 2020-07-04 RX ADMIN — ASPIRIN 81 MG SCH MG: 81 TABLET ORAL at 08:41

## 2020-07-04 RX ADMIN — CYCLOBENZAPRINE HYDROCHLORIDE SCH MG: 10 TABLET, FILM COATED ORAL at 17:08

## 2020-07-04 RX ADMIN — METHYLPREDNISOLONE SCH MG: 4 TABLET ORAL at 05:57

## 2020-07-04 RX ADMIN — MORPHINE SULFATE PRN MG: 4 INJECTION INTRAVENOUS at 18:59

## 2020-07-04 RX ADMIN — MORPHINE SULFATE PRN MG: 4 INJECTION INTRAVENOUS at 05:57

## 2020-07-04 RX ADMIN — ENALAPRIL MALEATE SCH MG: 5 TABLET ORAL at 08:42

## 2020-07-04 NOTE — PULMONOLOGY PROGRESS NOTE
Subjective


ROS Limited/Unobtainable:  No


Interval Events:  c/o cramps in legs


Allergies:  


Coded Allergies:  


     No Known Allergies (Verified , 6/7/11)


All Systems:  reviewed and negative except above - leg spasms


Subjective


CP resolved


tele NGT/ SR


some SOB, no cough


pain overall controlled





Objective





Last 24 Hour Vital Signs








  Date Time  Temp Pulse Resp B/P (MAP) Pulse Ox O2 Delivery O2 Flow Rate FiO2


 


7/4/20 06:27 97.5       


 


7/4/20 04:00 97.5 86 20 136/75 (95) 98   


 


7/4/20 04:00  97      


 


7/4/20 00:00 97.5 86 20 137/76 (96) 96   


 


7/4/20 00:00  85      


 


7/3/20 21:00      Room Air  


 


7/3/20 20:00  103      


 


7/3/20 20:00 96.6 99 20 147/78 (101) 98   


 


7/3/20 16:00  92      


 


7/3/20 16:00 97.5 99 20 155/79 (104) 98   


 


7/3/20 12:00 97.5 85 20 147/71 (96) 99   


 


7/3/20 12:00  89      


 


7/3/20 09:00      Room Air  


 


7/3/20 08:47  90  166/73    


 


7/3/20 08:47    166/73    

















Intake and Output  


 


 7/3/20 7/4/20





 19:00 07:00


 


Intake Total 400 ml 500 ml


 


Output Total 800 ml 700 ml


 


Balance -400 ml -200 ml


 


  


 


Intake Oral 400 ml 500 ml


 


Output Urine Total 800 ml 700 ml


 


# Voids 1 








General Appearance:  WD/WN


HEENT:  normocephalic, atraumatic, PERRL, supple


Respiratory:  chest wall non-tender, lungs clear


Cardiovascular:  normal rate


Abdomen:  normal bowel sounds, soft, non tender, non distended


Extremities:  no edema, pedal pulses normal


Neurologic:  alert, oriented x 3, responsive


Musculoskeletal:  normal muscle bulk


Laboratory Tests


7/3/20 20:29: POC Whole Blood Glucose [Pending]


7/4/20 06:08: POC Whole Blood Glucose 368H





Current Medications








 Medications


  (Trade)  Dose


 Ordered  Sig/Toni


 Route


 PRN Reason  Start Time


 Stop Time Status Last Admin


Dose Admin


 


 Acetaminophen


  (Tylenol)  650 mg  Q4H  PRN


 ORAL


 Mild Pain (Pain Scale 1-3)  6/30/20 02:30


 7/30/20 02:29   


 


 


 Amlodipine


 Besylate


  (Norvasc)  5 mg  DAILY


 ORAL


   7/3/20 09:00


 8/2/20 08:59  7/3/20 08:47


 


 


 Aspirin


  (ASA)  81 mg  DAILY


 NG


   6/30/20 12:30


 8/14/20 12:29  7/3/20 08:47


 


 


 Cyclobenzaprine


 HCl


  (Flexeril)  10 mg  THREE TIMES A  DAY


 ORAL


   7/1/20 22:15


 7/31/20 22:14  7/3/20 17:10


 


 


 Dextrose


  (Dextrose 50%)  25 ml  Q30M  PRN


 IV


 Hypoglycemia  6/30/20 04:30


 9/28/20 04:29   


 


 


 Dextrose


  (Dextrose 50%)  50 ml  Q30M  PRN


 IV


 Hypoglycemia  6/30/20 04:30


 9/28/20 04:29   


 


 


 Enalapril Maleate


  (Vasotec)  10 mg  DAILY


 ORAL


   6/30/20 12:15


 7/30/20 12:14  7/3/20 08:47


 


 


 Enoxaparin Sodium


  (Lovenox)  40 mg  Q24H


 SUBQ


   6/30/20 09:00


 9/28/20 08:59  7/3/20 08:55


 


 


 Gadobutrol


  (Gadavist)  7.5 mmol  NOW  PRN


 IV


 Radiology Procedure  7/1/20 15:30


 7/5/20 15:23   


 


 


 Hydralazine HCl


  (Apresoline)  25 mg  Q6H  PRN


 ORAL


 For High Blood Pressure  6/30/20 04:30


 9/28/20 04:29  7/3/20 05:04


 


 


 Insulin Aspart


  (NovoLOG)    BEFORE MEALS AND  HS


 SUBQ


   6/30/20 06:30


 9/28/20 06:29  7/4/20 06:49


 


 


 Methylprednisolone


  (Medrol)  4 mg  ACBREAKFAST


 ORAL


   7/2/20 06:30


 7/6/20 08:30  7/4/20 05:57


 


 


 Methylprednisolone


  (Medrol)  4 mg  Q24H


 ORAL


   7/4/20 12:30


 7/4/20 13:00   


 


 


 Methylprednisolone


  (Medrol)  4 mg  QHS


 ORAL


   7/3/20 21:00


 7/5/20 22:00  7/3/20 20:21


 


 


 Morphine Sulfate


  (Morphine


 Sulfate)  1 mg  Q4H  PRN


 IVP


 Moderate Pain (Pain Scale 4-6)  6/30/20 02:30


 7/7/20 02:29  7/3/20 01:58


 


 


 Morphine Sulfate


  (Morphine


 Sulfate)  4 mg  Q4H  PRN


 IVP


 Severe Pain (Pain Scale 7-10)  6/30/20 02:30


 7/7/20 02:29  7/4/20 05:57


 


 


 Ondansetron HCl


  (Zofran)  4 mg  Q6H  PRN


 IVP


 Nausea & Vomiting  6/30/20 02:30


 7/30/20 02:29   


 


 


 Polyethylene


 Glycol


  (Miralax)  17 gm  HSPRN  PRN


 ORAL


 Constipation  6/30/20 02:30


 7/30/20 02:29   


 











Assessment/Plan


Assessment/Plan


ASSESSMENT


Chest wall pain 


Elevated troponin,  


Leg cramps


Hypertension


Diabetes mellitus


Lumbar pain due to  lumbar spine radiculopathy


History of TIA


Positive HAL


 





PLAN OF CARE


telemetry


serial troponin with  minimal elevation


EKG no acute ischemic changes


Echo with pEF 65-70%, mild LVH, no evidence of WMA


Venous duplex-> no evidence of DVT 


DVT prophylaxis with Lovenox





O2 PRN titrate to keep sat above 90% 


pulmonary toilet prn


pulse ox remained stable on RA


all imaging noted 


ribs x-ray and sternum x-ray  ->no evidence of fracture ; no definite 

abnormality


MRI of the brain-> with chronic microvascular ischemic changes , no evidence of 

acute IC abnormality


MRI of C-spine -> no acute abnormality, mild to moderate degenerative changes


MRI of T spine -> no acute abnormality,  minimal degenerative changes


MRI of L-spine ->multiply level discogenic degenerative disease,  progressed 

slightly from prior exam; severe left and moderate right foraminal narrowing 

with abutment of the exiting left L5 nerve root


started on steroid pulse with tapering 


neuro eval 


HAL+


FTA and RPR NGT





pain management


fall precaution 


PT eval and RX 


BP management with CCB 


continue aspirin 


BS management with SSI


supportive care 


bowel regimen








case discussed and evaluated by supervising physician











Divina De Santiago NP Jul 4, 2020 08:24

## 2020-07-04 NOTE — NUR
HAND-OFF: 

Report given to Dunia PARK RN. Pt denies pain at this time; in stable condition. Plan of care 
endorsed.

## 2020-07-04 NOTE — NUR
NURSE NOTES:

Pt. received from LUIS Duque. Pt. AAOx4, on NC 2L, no signs of respiratory distress, and no 
complaints of pain at this time.  IV right AC 20g intact and patent, saline locked. Bed low 
and locked, side rails x3 up, bed alarm active, and call light in reach.

## 2020-07-04 NOTE — INTERNAL MED PROGRESS NOTE
Subjective


Date of Service:  Jul 4, 2020


Physician Name


KristaRegan


Attending Physician


Vin Armstrong MD





Current Medications








 Medications


  (Trade)  Dose


 Ordered  Sig/Toni


 Route


 PRN Reason  Start Time


 Stop Time Status Last Admin


Dose Admin


 


 Acetaminophen


  (Tylenol)  650 mg  Q4H  PRN


 ORAL


 Mild Pain (Pain Scale 1-3)  6/30/20 02:30


 7/30/20 02:29   


 


 


 Amlodipine


 Besylate


  (Norvasc)  5 mg  DAILY


 ORAL


   7/3/20 09:00


 8/2/20 08:59  7/4/20 08:41


 


 


 Aspirin


  (ASA)  81 mg  DAILY


 NG


   6/30/20 12:30


 8/14/20 12:29  7/4/20 08:41


 


 


 Cyclobenzaprine


 HCl


  (Flexeril)  10 mg  THREE TIMES A  DAY


 ORAL


   7/1/20 22:15


 7/31/20 22:14  7/4/20 12:13


 


 


 Dextrose


  (Dextrose 50%)  25 ml  Q30M  PRN


 IV


 Hypoglycemia  7/4/20 15:30


 10/2/20 15:29   


 


 


 Dextrose


  (Dextrose 50%)  50 ml  Q30M  PRN


 IV


 Hypoglycemia  7/4/20 15:30


 10/2/20 15:29   


 


 


 Enalapril Maleate


  (Vasotec)  10 mg  DAILY


 ORAL


   6/30/20 12:15


 7/30/20 12:14  7/4/20 08:42


 


 


 Enoxaparin Sodium


  (Lovenox)  40 mg  Q24H


 SUBQ


   6/30/20 09:00


 9/28/20 08:59  7/4/20 08:44


 


 


 Gadobutrol


  (Gadavist)  7.5 mmol  NOW  PRN


 IV


 Radiology Procedure  7/1/20 15:30


 7/5/20 15:23   


 


 


 Hydralazine HCl


  (Apresoline)  25 mg  Q6H  PRN


 ORAL


 For High Blood Pressure  6/30/20 04:30


 9/28/20 04:29  7/3/20 05:04


 


 


 Insulin Aspart


  (NovoLOG)    BEFORE MEALS AND  HS


 SUBQ


   6/30/20 06:30


 9/28/20 06:29  7/4/20 12:00


 


 


 Insulin Aspart


  (NovoLOG)  10 units  NOVOTIAC


 SUBQ


   7/4/20 16:50


 10/2/20 16:49   


 


 


 Insulin Detemir


  (Levemir)  30 units  BEDTIME


 SUBQ


   7/4/20 21:00


 10/2/20 20:59   


 


 


 Methylprednisolone


  (Medrol)  4 mg  ACBREAKFAST


 ORAL


   7/2/20 06:30


 7/6/20 08:30  7/4/20 05:57


 


 


 Methylprednisolone


  (Medrol)  4 mg  QHS


 ORAL


   7/3/20 21:00


 7/5/20 22:00  7/3/20 20:21


 


 


 Morphine Sulfate


  (Morphine


 Sulfate)  1 mg  Q4H  PRN


 IVP


 Moderate Pain (Pain Scale 4-6)  6/30/20 02:30


 7/7/20 02:29  7/3/20 01:58


 


 


 Morphine Sulfate


  (Morphine


 Sulfate)  4 mg  Q4H  PRN


 IVP


 Severe Pain (Pain Scale 7-10)  6/30/20 02:30


 7/7/20 02:29  7/4/20 05:57


 


 


 Ondansetron HCl


  (Zofran)  4 mg  Q6H  PRN


 IVP


 Nausea & Vomiting  6/30/20 02:30


 7/30/20 02:29   


 


 


 Polyethylene


 Glycol


  (Miralax)  17 gm  HSPRN  PRN


 ORAL


 Constipation  6/30/20 02:30


 7/30/20 02:29   


 








Allergies:  


Coded Allergies:  


     No Known Allergies (Verified , 6/7/11)


ROS Limited/Unobtainable:  No


Constitutional:  Reports: no symptoms


HEENT:  Reports: no symptoms


Cardiovascular:  Reports: no symptoms


Respiratory:  Reports: no symptoms


Gastrointestinal/Abdominal:  Reports: no symptoms


Genitourinary:  Reports: no symptoms


Neurologic/Psychiatric:  Reports: no symptoms


Subjective


65 YO F admitted with back pain.  Now lumbar stenosis. Cover for Int Med-Dr Munroe





Objective





Last Vital Signs








  Date Time  Temp Pulse Resp B/P (MAP) Pulse Ox O2 Delivery O2 Flow Rate FiO2


 


7/4/20 12:08 97.7 80 18 126/67 (86) 100   


 


7/4/20 09:00      Room Air  











Laboratory Tests








Test


  7/3/20


20:29 7/4/20


06:08


 


POC Whole Blood Glucose


  Pending  


  368 MG/DL


()  H











Microbiology








 Date/Time


Source Procedure


Growth Status


 


 


 7/2/20 18:00


Nasopharynx Coronavirus COVID-19 PCR ([facility]) - Final Complete

















Intake and Output  


 


 7/3/20 7/4/20





 19:00 07:00


 


Intake Total 400 ml 500 ml


 


Output Total 800 ml 700 ml


 


Balance -400 ml -200 ml


 


  


 


Intake Oral 400 ml 500 ml


 


Output Urine Total 800 ml 700 ml


 


# Voids 1 








Objective


Objective


General: No acute distress, awake and alert


HEENT: NCAT, sclera anicteric, PERRL, EOMI.


Neck: Supple, no significant jugular venous distention, 


Lungs: Fair inspiratory effort, clear to auscultation bilaterally, no Wheeze or 

Rales.


Heart: Regular rate and rhythm, normal S1/S2, no murmurs, distant heart sound.


Abdomen: soft, nontender, nondistended. Normoactive bowel sounds, Morbid 

obesity.


Extremities: No Cyanosis , clubbing or edema. 


Neuro: A&O x 3, Able to move all extremities, Left side weaker than right side.


Psych: Normal mood and affect





Assessment/Plan


1. Lumbar pain 2/2 severe left and moderate right foraminal narrowing with 

abutment of the exiting left L5 nerve root


2. Chest pain with intermediate troponin.


3. Diabetes.


4. Hypertension.


5. History of trigeminal neuralgia.


6. Hemochromatosis.





PLAN:


1. In Telemetry.


2. Cardiology consult.


3. Neurology consult.


4. Flexeril 10 mg p.o. TID


5. MRI of spine (C/T/L) - shows  severe left and moderate right foraminal 

narrowing with abutment of the exiting left L5 nerve root


6. PT evaluation 


7. On aspirin.


8. Full code.


9. DVT prophylaxis: Lovenox SQ


10. trial of Decadron taper











Regan Velasco MD Jul 4, 2020 15:41

## 2020-07-04 NOTE — NUR
NURSE NOTES: 

RECEIVED PATIENT IN BED RESTING COMFORTABLY. AWAKE, ALERT/ORIENTED X4, ON O2 2L VIA NC. ABLE 
TO MAKE NEEDS KNOWN, DENIES PAIN/DISCOMFORT NOTED. NO SIGNS AND SYMPTOMS OF ACUTE 
CARDIO-RESPIRATORY DISTRESS/SHORTNESS OF BREATH, DENIES CHEST PAIN.  VERBALIZED 
UNDERSTANDING. IV INTACT TO RIGHT AC 20G, SALINE LOCK, NO REDNESS/SWELLING NOTED. NO N/V/D. 
SAFETY MAINTAINED. SIDE RAILS UP X3 FOR MOBILITY, BED IN LOWEST POSITION FOR SAFETY, 
ENCOURAGED PATIENT TO UTILIZE CALL LIGHT FOR ASSISTANCE, VERBALIZED UNDERSTANDING. BED 
BRAKES ENGAGED AND LOCKED @ ALL TIMES. CONTINUE WITH CURRENT PLAN OF CARE.

## 2020-07-04 NOTE — CARDIOLOGY PROGRESS NOTE
Assessment/Plan


Assessment/Plan


no new cardiac symptoms, sinsu rhythm on telemetry





Subjective


Subjective


The patient does not have any cardiac complaints, feels hot, but denies chills





Objective





Last 24 Hour Vital Signs








  Date Time  Temp Pulse Resp B/P (MAP) Pulse Ox O2 Delivery O2 Flow Rate FiO2


 


7/4/20 16:03  79      


 


7/4/20 16:03 97.7 94 18 146/72 (96) 100   


 


7/4/20 12:08 97.7 80 18 126/67 (86) 100   


 


7/4/20 12:00  84      


 


7/4/20 09:00      Room Air  


 


7/4/20 08:42    146/83    


 


7/4/20 08:41  103  146/83    


 


7/4/20 08:00 97.8 103 21 146/83 (104) 99   


 


7/4/20 08:00  78      


 


7/4/20 06:27 97.5       


 


7/4/20 04:00 97.5 86 20 136/75 (95) 98   


 


7/4/20 04:00  97      


 


7/4/20 00:00 97.5 86 20 137/76 (96) 96   


 


7/4/20 00:00  85      


 


7/3/20 21:00      Room Air  


 


7/3/20 20:00  103      


 


7/3/20 20:00 96.6 99 20 147/78 (101) 98   








General Appearance:  no apparent distress


EENT:  PERRL/EOMI


Neck:  no JVD


Rhythm:  NSR


Cardiovascular:  regular rhythm


Respiratory/Chest:  rhonchi - bilaterally


Abdomen:  no organomegaly











Intake and Output  


 


 7/3/20 7/4/20





 19:00 07:00


 


Intake Total 400 ml 500 ml


 


Output Total 800 ml 700 ml


 


Balance -400 ml -200 ml


 


  


 


Intake Oral 400 ml 500 ml


 


Output Urine Total 800 ml 700 ml


 


# Voids 1 











Laboratory Tests








Test


  7/3/20


20:29 7/4/20


06:08 7/4/20


15:40


 


POC Whole Blood Glucose


  Pending  


  368 MG/DL


()  H 


 


 


Hemoglobin A1c


  


  


  9.3 %


(4.3-6.0)  H











Microbiology








 Date/Time


Source Procedure


Growth Status


 


 


 7/2/20 18:00


Nasopharynx Coronavirus COVID-19 PCR ([facility]) - Final Complete

















Charu Garcia MD Jul 4, 2020 16:54

## 2020-07-05 VITALS — DIASTOLIC BLOOD PRESSURE: 78 MMHG | SYSTOLIC BLOOD PRESSURE: 139 MMHG

## 2020-07-05 VITALS — SYSTOLIC BLOOD PRESSURE: 157 MMHG | DIASTOLIC BLOOD PRESSURE: 77 MMHG

## 2020-07-05 VITALS — SYSTOLIC BLOOD PRESSURE: 151 MMHG | DIASTOLIC BLOOD PRESSURE: 73 MMHG

## 2020-07-05 VITALS — DIASTOLIC BLOOD PRESSURE: 86 MMHG | SYSTOLIC BLOOD PRESSURE: 142 MMHG

## 2020-07-05 VITALS — DIASTOLIC BLOOD PRESSURE: 88 MMHG | SYSTOLIC BLOOD PRESSURE: 140 MMHG

## 2020-07-05 VITALS — DIASTOLIC BLOOD PRESSURE: 86 MMHG | SYSTOLIC BLOOD PRESSURE: 167 MMHG

## 2020-07-05 LAB
ADD MANUAL DIFF: NO
ANION GAP SERPL CALC-SCNC: 8 MMOL/L (ref 5–15)
BASOPHILS NFR BLD AUTO: 0.6 % (ref 0–2)
BUN SERPL-MCNC: 17 MG/DL (ref 7–18)
CALCIUM SERPL-MCNC: 8.9 MG/DL (ref 8.5–10.1)
CHLORIDE SERPL-SCNC: 103 MMOL/L (ref 98–107)
CO2 SERPL-SCNC: 28 MMOL/L (ref 21–32)
CREAT SERPL-MCNC: 0.8 MG/DL (ref 0.55–1.3)
EOSINOPHIL NFR BLD AUTO: 1.3 % (ref 0–3)
ERYTHROCYTE [DISTWIDTH] IN BLOOD BY AUTOMATED COUNT: 11.5 % (ref 11.6–14.8)
HCT VFR BLD CALC: 39.9 % (ref 37–47)
HGB BLD-MCNC: 13.2 G/DL (ref 12–16)
LYMPHOCYTES NFR BLD AUTO: 13.2 % (ref 20–45)
MCV RBC AUTO: 97 FL (ref 80–99)
MONOCYTES NFR BLD AUTO: 6.3 % (ref 1–10)
NEUTROPHILS NFR BLD AUTO: 78.7 % (ref 45–75)
PLATELET # BLD: 292 K/UL (ref 150–450)
POTASSIUM SERPL-SCNC: 4.4 MMOL/L (ref 3.5–5.1)
RBC # BLD AUTO: 4.1 M/UL (ref 4.2–5.4)
SODIUM SERPL-SCNC: 139 MMOL/L (ref 136–145)
WBC # BLD AUTO: 8.2 K/UL (ref 4.8–10.8)

## 2020-07-05 RX ADMIN — ENOXAPARIN SODIUM SCH MG: 40 INJECTION SUBCUTANEOUS at 10:00

## 2020-07-05 RX ADMIN — INSULIN ASPART SCH UNITS: 100 INJECTION, SOLUTION INTRAVENOUS; SUBCUTANEOUS at 21:27

## 2020-07-05 RX ADMIN — MORPHINE SULFATE PRN MG: 4 INJECTION INTRAVENOUS at 18:49

## 2020-07-05 RX ADMIN — MORPHINE SULFATE PRN MG: 4 INJECTION INTRAVENOUS at 14:23

## 2020-07-05 RX ADMIN — INSULIN ASPART SCH UNITS: 100 INJECTION, SOLUTION INTRAVENOUS; SUBCUTANEOUS at 05:50

## 2020-07-05 RX ADMIN — ASPIRIN 81 MG SCH MG: 81 TABLET ORAL at 09:58

## 2020-07-05 RX ADMIN — CYCLOBENZAPRINE HYDROCHLORIDE SCH MG: 10 TABLET, FILM COATED ORAL at 10:01

## 2020-07-05 RX ADMIN — CYCLOBENZAPRINE HYDROCHLORIDE SCH MG: 10 TABLET, FILM COATED ORAL at 18:27

## 2020-07-05 RX ADMIN — MORPHINE SULFATE PRN MG: 4 INJECTION INTRAVENOUS at 03:44

## 2020-07-05 RX ADMIN — INSULIN ASPART SCH UNITS: 100 INJECTION, SOLUTION INTRAVENOUS; SUBCUTANEOUS at 17:03

## 2020-07-05 RX ADMIN — INSULIN ASPART SCH UNITS: 100 INJECTION, SOLUTION INTRAVENOUS; SUBCUTANEOUS at 12:28

## 2020-07-05 RX ADMIN — INSULIN ASPART SCH UNITS: 100 INJECTION, SOLUTION INTRAVENOUS; SUBCUTANEOUS at 05:51

## 2020-07-05 RX ADMIN — ENALAPRIL MALEATE SCH MG: 5 TABLET ORAL at 09:59

## 2020-07-05 RX ADMIN — CYCLOBENZAPRINE HYDROCHLORIDE SCH MG: 10 TABLET, FILM COATED ORAL at 13:10

## 2020-07-05 RX ADMIN — MORPHINE SULFATE PRN MG: 4 INJECTION INTRAVENOUS at 10:07

## 2020-07-05 NOTE — GENERAL PROGRESS NOTE
Assessment/Plan


Problem List:  


(1) ACS (acute coronary syndrome)


ICD Codes:  I24.9 - Acute ischemic heart disease, unspecified


SNOMED:  333138882


(2) History of diabetes mellitus


ICD Codes:  Z86.39 - Personal history of other endocrine, nutritional and 

metabolic disease


SNOMED:  496910962


(3) History of TIAs


ICD Codes:  Z86.73 - Personal history of transient ischemic attack (TIA), and 

cerebral infarction without residual deficits


SNOMED:  934598299


(4) History of hypertension


ICD Codes:  Z86.79 - Personal history of other diseases of the circulatory 

system


SNOMED:  260969525


Assessment/Plan:


increase Levemir to 44 units qhs 


increase Novolog to 16 units ac tid 


continue Novolog sliding scale ac  hs





Subjective


Allergies:  


Coded Allergies:  


     No Known Allergies (Verified , 6/7/11)


All Systems:  reviewed and negative except above


Subjective


events noted 


glucose values are still elevated despite addition of high dose insulin regimen 











Item Value  Date Time


 


Bedside Blood Glucose 293 mg/dl H 7/5/20 1228


 


Bedside Blood Glucose 284 mg/dl H 7/5/20 0607


 


Bedside Blood Glucose 254 mg/dl H 7/4/20 2100


 


Bedside Blood Glucose 372 mg/dl H 7/4/20 1711











Objective





Last 24 Hour Vital Signs








  Date Time  Temp Pulse Resp B/P (MAP) Pulse Ox O2 Delivery O2 Flow Rate FiO2


 


7/5/20 12:20 97.8 77 20 167/86 (113) 100   


 


7/5/20 10:37 97.9       


 


7/5/20 09:59  80  142/86    


 


7/5/20 09:59    142/86    


 


7/5/20 09:00      Nasal Cannula 3.0 


 


7/5/20 08:00 97.9 80 18 142/86 (104) 99   


 


7/5/20 08:00  76      


 


7/5/20 04:00 97.0 76 18 151/73 (99) 96   


 


7/5/20 04:00  72      


 


7/5/20 00:00 97.4 78 18 157/77 (103) 98   


 


7/5/20 00:00  80      


 


7/4/20 21:00      Nasal Cannula 2.0 


 


7/4/20 20:00 97.9 85 18 156/80 (105) 98   


 


7/4/20 20:00  88      


 


7/4/20 16:03  79      


 


7/4/20 16:03 97.7 94 18 146/72 (96) 100   

















Intake and Output  


 


 7/4/20 7/5/20





 19:00 07:00


 


Intake Total 490 ml 600 ml


 


Output Total 850 ml 800 ml


 


Balance -360 ml -200 ml


 


  


 


Intake Oral 490 ml 600 ml


 


Output Urine Total 850 ml 800 ml








Laboratory Tests


7/4/20 15:40: Hemoglobin A1c 9.3H


7/4/20 20:50: POC Whole Blood Glucose [Pending]


7/5/20 06:05: 


White Blood Count 8.2, Red Blood Count 4.10L, Hemoglobin 13.2, Hematocrit 39.9, 

Mean Corpuscular Volume 97, Mean Corpuscular Hemoglobin 32.1H, Mean Corpuscular 

Hemoglobin Concent 33.0, Red Cell Distribution Width 11.5L, Platelet Count 292, 

Mean Platelet Volume 7.5, Neutrophils (%) (Auto) 78.7H, Lymphocytes (%) (Auto) 

13.2L, Monocytes (%) (Auto) 6.3, Eosinophils (%) (Auto) 1.3, Basophils (%) (Auto

) 0.6, Sodium Level 139, Potassium Level 4.4, Chloride Level 103, Carbon 

Dioxide Level 28, Anion Gap 8, Blood Urea Nitrogen 17, Creatinine 0.8, Estimat 

Glomerular Filtration Rate > 60, Glucose Level 323H, Calcium Level 8.9


7/5/20 12:21: POC Whole Blood Glucose [Pending]


Height (Feet):  5


Height (Inches):  8.00


Weight (Pounds):  166


General Appearance:  no apparent distress


Neck:  normal alignment


Cardiovascular:  normal rate


Respiratory/Chest:  lungs clear


Abdomen:  normal bowel sounds


Objective





Current Medications








 Medications


  (Trade)  Dose


 Ordered  Sig/Toni


 Route


 PRN Reason  Start Time


 Stop Time Status Last Admin


Dose Admin


 


 Acetaminophen


  (Tylenol)  650 mg  Q4H  PRN


 ORAL


 Mild Pain (Pain Scale 1-3)  7/5/20 11:00


 7/30/20 10:59   


 


 


 Amlodipine


 Besylate


  (Norvasc)  5 mg  DAILY


 ORAL


   7/6/20 09:00


 8/2/20 08:59   


 


 


 Aspirin


  (ASA)  81 mg  DAILY


 ORAL


   7/6/20 09:00


 8/14/20 12:29   


 


 


 Cyclobenzaprine


 HCl


  (Flexeril)  10 mg  THREE TIMES A  DAY


 ORAL


   7/5/20 13:00


 7/31/20 22:14   


 


 


 Dextrose


  (Dextrose 50%)  25 ml  Q30M  PRN


 IV


 Hypoglycemia  7/5/20 11:00


 10/2/20 15:29   


 


 


 Dextrose


  (Dextrose 50%)  50 ml  Q30M  PRN


 IV


 Hypoglycemia  7/5/20 11:00


 10/2/20 15:29   


 


 


 Enalapril Maleate


  (Vasotec)  10 mg  DAILY


 ORAL


   7/6/20 09:00


 7/30/20 12:14   


 


 


 Enoxaparin Sodium


  (Lovenox)  40 mg  Q24H


 SUBQ


   7/6/20 09:00


 9/28/20 08:59   


 


 


 Gadobutrol


  (Gadavist)  7.5 mmol  NOW  PRN


 IV


 Radiology Procedure  7/5/20 11:00


 7/5/20 15:23   


 


 


 Hydralazine HCl


  (Apresoline)  25 mg  Q6H  PRN


 ORAL


 For High Blood Pressure  7/5/20 16:30


 9/28/20 04:29   


 


 


 Insulin Aspart


  (NovoLOG)    BEFORE MEALS AND  HS


 SUBQ


   7/5/20 11:30


 9/28/20 06:29  7/5/20 12:28


 


 


 Insulin Aspart


  (NovoLOG)  10 units  NOVOTIAC


 SUBQ


   7/5/20 11:50


 10/2/20 16:49  7/5/20 12:27


 


 


 Insulin Detemir


  (Levemir)  30 units  BEDTIME


 SUBQ


   7/5/20 21:00


 10/2/20 20:59   


 


 


 Methylprednisolone


  (Medrol)  4 mg  ACBREAKFAST


 ORAL


   7/6/20 06:30


 7/9/20 08:30   


 


 


 Methylprednisolone


  (Medrol)  4 mg  QHS


 ORAL


   7/5/20 21:00


 7/6/20 22:00   


 


 


 Morphine Sulfate


  (Morphine


 Sulfate)  1 mg  Q4H  PRN


 IVP


 Moderate Pain (Pain Scale 4-6)  7/5/20 11:15


 7/7/20 11:14   


 


 


 Morphine Sulfate


  (Morphine


 Sulfate)  4 mg  Q4H  PRN


 IVP


 Severe Pain (Pain Scale 7-10)  7/5/20 11:15


 7/7/20 11:14   


 


 


 Ondansetron HCl


  (Zofran)  4 mg  Q6H  PRN


 IVP


 Nausea & Vomiting  7/5/20 11:15


 7/30/20 11:14   


 


 


 Polyethylene


 Glycol


  (Miralax)  17 gm  HSPRN  PRN


 ORAL


 Constipation  7/5/20 11:15


 8/4/20 11:14   


 

















Vasile Simeon MD Jul 5, 2020 12:52

## 2020-07-05 NOTE — NUR
NURSES NOTE:



Rounds made with DIANA Mi. Pt in bed,  at bedside. No outward s/s of distress noted. 
Breathing pattern is even and unlabored on RA. LLQ ileo, set to drain by gravity, patent, to 
be flushed q3h and PRN. Dressing, L abdomen, is clean, dry and intact. IV R forearm 22 
gauge, is intact, infusing IVF fluids without incident. Pt complains of pain 6/10 in 
abdominal area. PRN Pain meds will be given accordingly. Bed at lowest level, call light 
within reach. Pt will continue to be monitored. 

-------------------------------------------------------------------------------

Addendum: 07/05/20 at 2046 by Alicia Aggarwal RN

-------------------------------------------------------------------------------

PLS disregard. Charted under incorrect patient.

## 2020-07-05 NOTE — CONSULTATION
DATE OF CONSULTATION:  07/04/2020

ENDOCRINOLOGY CONSULTATION



CONSULTING PHYSICIAN:  Vasile Simeon MD



REFERRING PHYSICIAN:  Vin Armstrong MD



REASON FOR CONSULTATION:  Diabetes management.



HISTORY OF PRESENT ILLNESS:  The patient is a 64-year-old female with a

history of diabetes, chronic hip pain, and chronic knee pain, hereditary

hemochromatosis, presented with generalized body pain and severe muscle spasm

that was getting worse over the past 3 days.  Also complaining of chest

pain, substernal in location, radiating to the left shoulder and neck.

The patient was admitted to the floor for observation and treatment.  I

was called to manage diabetes since the glucose is exacerbated by

steroids.



PAST MEDICAL HISTORY:

1. Diabetes.

2. Hypertension.

3. Hip pain.

4. Trigeminal neuralgia.

5. Hereditary hemochromatosis.



PAST SURGICAL HISTORY:  None.



MEDICATIONS:  Reviewed and reconciled.



ALLERGIES TO MEDICATIONS:  None.



SOCIAL HISTORY:  The patient is a nonsmoker.  No smoking, alcohol, or drug

use.



REVIEW OF SYSTEMS:  A 12-point review of systems was performed, and

pertinent positives and negatives were mentioned in history of present

illness.



PHYSICAL EXAM:

GENERAL:  Awake and alert.

VITAL SIGNS:  Blood pressure is 126/67, heart rate 80, temperature 97.7,

and respiratory rate 18.

HEENT:  Pupils are equal and reactive to light.  Sclerae anicteric.

NECK:  No JVD.

HEART:  Regular.

LUNGS:  Clear.

ABDOMEN:  Positive bowel sounds.

EXTREMITIES:  No clubbing, cyanosis, or edema.



LABORATORY VALUES:  WBC 7, hemoglobin 13, hematocrit 39, and platelets of

274,000.  Sodium 135, potassium 4.6, chloride 100, bicarb 25, BUN 17, and

creatinine 1.0  Glucose 374.



DIAGNOSES:

1. Lumbar radiculopathy with foraminal narrowing.

2. Diabetes, exacerbated by steroids.

3. Hypertension.



PLAN:

1. Start Levemir 30 units at bedtime.

2. Start NovoLog 10 units before each meal.

3. NovoLog sliding scale before meals and at bedtime.

4. Hypoglycemia protocol has been ordered.

5. Further adjustment according to blood glucose values.



Thank you, Dr. Armstrong, for the courtesy of this consultation.









  ______________________________________________

  Vasile Simeon M.D.





DR:  RN/YK

D:  07/04/2020 15:30

T:  07/05/2020 00:48

JOB#:  [id number]

CC:



PATRIC

## 2020-07-05 NOTE — PULMONOLOGY PROGRESS NOTE
Subjective


ROS Limited/Unobtainable:  No


Interval Events:  c/o cramps in legs


Allergies:  


Coded Allergies:  


     No Known Allergies (Verified , 6/7/11)


All Systems:  reviewed and negative except above - leg spasms


Subjective


CP resolved


tele NGT/ SR


some SOB, no cough


pain overall controlled





Objective





Last 24 Hour Vital Signs








  Date Time  Temp Pulse Resp B/P (MAP) Pulse Ox O2 Delivery O2 Flow Rate FiO2


 


7/5/20 04:00 97.0 76 18 151/73 (99) 96   


 


7/5/20 04:00  72      


 


7/5/20 00:00 97.4 78 18 157/77 (103) 98   


 


7/5/20 00:00  80      


 


7/4/20 21:00      Nasal Cannula 2.0 


 


7/4/20 20:00 97.9 85 18 156/80 (105) 98   


 


7/4/20 20:00  88      


 


7/4/20 16:03  79      


 


7/4/20 16:03 97.7 94 18 146/72 (96) 100   


 


7/4/20 12:08 97.7 80 18 126/67 (86) 100   


 


7/4/20 12:00  84      


 


7/4/20 09:00      Room Air  


 


7/4/20 08:42    146/83    


 


7/4/20 08:41  103  146/83    


 


7/4/20 08:00 97.8 103 21 146/83 (104) 99   


 


7/4/20 08:00  78      

















Intake and Output  


 


 7/4/20 7/5/20





 19:00 07:00


 


Intake Total 490 ml 600 ml


 


Output Total 850 ml 800 ml


 


Balance -360 ml -200 ml


 


  


 


Intake Oral 490 ml 600 ml


 


Output Urine Total 850 ml 800 ml








General Appearance:  WD/WN


HEENT:  normocephalic, atraumatic, PERRL, supple


Respiratory:  chest wall non-tender, lungs clear


Cardiovascular:  normal rate


Abdomen:  normal bowel sounds, soft, non tender, non distended


Extremities:  no edema, pedal pulses normal


Neurologic:  alert, oriented x 3, responsive


Musculoskeletal:  normal muscle bulk





Microbiology








 Date/Time


Source Procedure


Growth Status


 


 


 7/2/20 18:00


Nasopharynx Coronavirus COVID-19 PCR ([facility]) - Final Complete








Laboratory Tests


7/4/20 15:40: Hemoglobin A1c 9.3H


7/4/20 20:50: POC Whole Blood Glucose [Pending]


7/5/20 06:05: 


White Blood Count 8.2, Red Blood Count 4.10L, Hemoglobin 13.2, Hematocrit 39.9, 

Mean Corpuscular Volume 97, Mean Corpuscular Hemoglobin 32.1H, Mean Corpuscular 

Hemoglobin Concent 33.0, Red Cell Distribution Width 11.5L, Platelet Count 292, 

Mean Platelet Volume 7.5, Neutrophils (%) (Auto) 78.7H, Lymphocytes (%) (Auto) 

13.2L, Monocytes (%) (Auto) 6.3, Eosinophils (%) (Auto) 1.3, Basophils (%) (Auto

) 0.6, Sodium Level 139, Potassium Level 4.4, Chloride Level 103, Carbon 

Dioxide Level 28, Anion Gap 8, Blood Urea Nitrogen 17, Creatinine 0.8, Estimat 

Glomerular Filtration Rate > 60, Glucose Level 323H, Calcium Level 8.9





Current Medications








 Medications


  (Trade)  Dose


 Ordered  Sig/Toni


 Route


 PRN Reason  Start Time


 Stop Time Status Last Admin


Dose Admin


 


 Acetaminophen


  (Tylenol)  650 mg  Q4H  PRN


 ORAL


 Mild Pain (Pain Scale 1-3)  6/30/20 02:30


 7/30/20 02:29   


 


 


 Amlodipine


 Besylate


  (Norvasc)  5 mg  DAILY


 ORAL


   7/3/20 09:00


 8/2/20 08:59  7/4/20 08:41


 


 


 Aspirin


  (ASA)  81 mg  DAILY


 NG


   6/30/20 12:30


 8/14/20 12:29  7/4/20 08:41


 


 


 Cyclobenzaprine


 HCl


  (Flexeril)  10 mg  THREE TIMES A  DAY


 ORAL


   7/1/20 22:15


 7/31/20 22:14  7/4/20 17:08


 


 


 Dextrose


  (Dextrose 50%)  25 ml  Q30M  PRN


 IV


 Hypoglycemia  7/4/20 15:30


 10/2/20 15:29   


 


 


 Dextrose


  (Dextrose 50%)  50 ml  Q30M  PRN


 IV


 Hypoglycemia  7/4/20 15:30


 10/2/20 15:29   


 


 


 Enalapril Maleate


  (Vasotec)  10 mg  DAILY


 ORAL


   6/30/20 12:15


 7/30/20 12:14  7/4/20 08:42


 


 


 Enoxaparin Sodium


  (Lovenox)  40 mg  Q24H


 SUBQ


   6/30/20 09:00


 9/28/20 08:59  7/4/20 08:44


 


 


 Gadobutrol


  (Gadavist)  7.5 mmol  NOW  PRN


 IV


 Radiology Procedure  7/1/20 15:30


 7/5/20 15:23   


 


 


 Hydralazine HCl


  (Apresoline)  25 mg  Q6H  PRN


 ORAL


 For High Blood Pressure  6/30/20 04:30


 9/28/20 04:29  7/3/20 05:04


 


 


 Insulin Aspart


  (NovoLOG)    BEFORE MEALS AND  HS


 SUBQ


   6/30/20 06:30


 9/28/20 06:29  7/5/20 05:51


 


 


 Insulin Aspart


  (NovoLOG)  10 units  NOVOTIAC


 SUBQ


   7/4/20 16:50


 10/2/20 16:49  7/5/20 05:50


 


 


 Insulin Detemir


  (Levemir)  30 units  BEDTIME


 SUBQ


   7/4/20 21:00


 10/2/20 20:59  7/4/20 20:54


 


 


 Methylprednisolone


  (Medrol)  4 mg  ACBREAKFAST


 ORAL


   7/5/20 06:30


 7/9/20 08:30  7/5/20 05:48


 


 


 Methylprednisolone


  (Medrol)  4 mg  QHS


 ORAL


   7/4/20 21:00


 7/6/20 22:00  7/4/20 20:52


 


 


 Morphine Sulfate


  (Morphine


 Sulfate)  1 mg  Q4H  PRN


 IVP


 Moderate Pain (Pain Scale 4-6)  6/30/20 02:30


 7/7/20 02:29  7/3/20 01:58


 


 


 Morphine Sulfate


  (Morphine


 Sulfate)  4 mg  Q4H  PRN


 IVP


 Severe Pain (Pain Scale 7-10)  6/30/20 02:30


 7/7/20 02:29  7/5/20 03:44


 


 


 Ondansetron HCl


  (Zofran)  4 mg  Q6H  PRN


 IVP


 Nausea & Vomiting  6/30/20 02:30


 7/30/20 02:29   


 


 


 Polyethylene


 Glycol


  (Miralax)  17 gm  HSPRN  PRN


 ORAL


 Constipation  6/30/20 02:30


 7/30/20 02:29   


 











Assessment/Plan


Assessment/Plan


ASSESSMENT


Chest wall pain 


Elevated troponin,  


Leg cramps


Hypertension


Diabetes mellitus


Lumbar pain due to  lumbar spine radiculopathy


History of TIA


Positive HAL


 





PLAN OF CARE


telemetry


serial troponin with  minimal elevation


EKG no acute ischemic changes


Echo with pEF 65-70%, mild LVH, no evidence of WMA


Venous duplex-> no evidence of DVT 


DVT prophylaxis with Lovenox





O2 PRN titrate to keep sat above 90% 


pulmonary toilet prn


pulse ox remained stable on RA


all imaging noted 


ribs x-ray and sternum x-ray  ->no evidence of fracture ; no definite 

abnormality


MRI of the brain-> with chronic microvascular ischemic changes , no evidence of 

acute IC abnormality


MRI of C-spine -> no acute abnormality, mild to moderate degenerative changes


MRI of T spine -> no acute abnormality,  minimal degenerative changes


MRI of L-spine ->multiply level discogenic degenerative disease,  progressed 

slightly from prior exam; severe left and moderate right foraminal narrowing 

with abutment of the exiting left L5 nerve root


started on steroid pulse with tapering 


neuro eval 


HAL+


FTA and RPR NGT





pain management


fall precaution 


PT eval and RX 


BP management with CCB 


continue aspirin 


BS management with SSI, Levemir added as per endo HgA1c -9.3


supportive care 


bowel regimen








case discussed and evaluated by supervising physician











Divina De Santiago NP Jul 5, 2020 07:47

## 2020-07-05 NOTE — NUR
NURSES NOTE:



Rounds made with DIANA Mi. Pt is sitting up in bed, A/OX4, able to answers all questions 
appropriately. Pt states she has pain 5/10 - generalized pain. Pain meds will be given PRN 
to make pt comfortable. No outward s/s of distress noted. Breathing pattern is even and 
unlabored on Nasal Canula 3L/min. Side rails padded for seizure precautions. L forearm 22 
gauge IV in place, patent, Hep locked. All due meds will be given. Bed at lowest level, call 
light within reach. Pt will continue to be monitored. 

-------------------------------------------------------------------------------

Addendum: 07/05/20 at 2045 by Alicia Aggarwal RN

-------------------------------------------------------------------------------

Following charting made under incorrect pt. Pls disregard. 

-------------------------------------------------------------------------------

Addendum: 07/05/20 at 2057 by Alicia Aggarwal RN

-------------------------------------------------------------------------------

This note is for correct pt. Following charting (next nurse note) is incorrect pt. Pls read 
and apply THIS note to pt care for Yaquelin Banks room 301-1

## 2020-07-05 NOTE — NUR
NURSE NOTES:

Received report from Antoine Barajas RN.  Patient sitting up in bed, watching television, 
awake and alert, side rails up x 3, bed low and locked, call light within reach, no c/o 
pain, no SOB, in no aparent disterss.

## 2020-07-05 NOTE — INTERNAL MED PROGRESS NOTE
Subjective


Date of Service:  Jul 5, 2020


Physician Name


Regan Velasco


Attending Physician


Vin Armstrong MD





Current Medications








 Medications


  (Trade)  Dose


 Ordered  Sig/Toni


 Route


 PRN Reason  Start Time


 Stop Time Status Last Admin


Dose Admin


 


 Acetaminophen


  (Tylenol)  650 mg  Q4H  PRN


 ORAL


 Mild Pain (Pain Scale 1-3)  7/5/20 11:00


 7/30/20 10:59   


 


 


 Amlodipine


 Besylate


  (Norvasc)  5 mg  DAILY


 ORAL


   7/6/20 09:00


 8/2/20 08:59   


 


 


 Aspirin


  (ASA)  81 mg  DAILY


 ORAL


   7/6/20 09:00


 8/14/20 12:29   


 


 


 Cyclobenzaprine


 HCl


  (Flexeril)  10 mg  THREE TIMES A  DAY


 ORAL


   7/5/20 13:00


 7/31/20 22:14  7/5/20 13:10


 


 


 Dextrose


  (Dextrose 50%)  25 ml  Q30M  PRN


 IV


 Hypoglycemia  7/5/20 11:00


 10/2/20 15:29   


 


 


 Dextrose


  (Dextrose 50%)  50 ml  Q30M  PRN


 IV


 Hypoglycemia  7/5/20 11:00


 10/2/20 15:29   


 


 


 Enalapril Maleate


  (Vasotec)  10 mg  DAILY


 ORAL


   7/6/20 09:00


 7/30/20 12:14   


 


 


 Enoxaparin Sodium


  (Lovenox)  40 mg  Q24H


 SUBQ


   7/6/20 09:00


 9/28/20 08:59   


 


 


 Hydralazine HCl


  (Apresoline)  25 mg  Q6H  PRN


 ORAL


 For High Blood Pressure  7/5/20 16:30


 9/28/20 04:29   


 


 


 Insulin Aspart


  (NovoLOG)    BEFORE MEALS AND  HS


 SUBQ


   7/5/20 11:30


 9/28/20 06:29  7/5/20 12:28


 


 


 Insulin Aspart


  (NovoLOG)  16 units  NOVOTIAC


 SUBQ


   7/5/20 16:50


 10/2/20 16:49   


 


 


 Insulin Detemir


  (Levemir)  44 units  BEDTIME


 SUBQ


   7/5/20 21:00


 10/2/20 20:59   


 


 


 Methylprednisolone


  (Medrol)  4 mg  ACBREAKFAST


 ORAL


   7/6/20 06:30


 7/9/20 08:30   


 


 


 Methylprednisolone


  (Medrol)  4 mg  QHS


 ORAL


   7/5/20 21:00


 7/6/20 22:00   


 


 


 Morphine Sulfate


  (Morphine


 Sulfate)  1 mg  Q4H  PRN


 IVP


 Moderate Pain (Pain Scale 4-6)  7/5/20 11:15


 7/7/20 11:14   


 


 


 Morphine Sulfate


  (Morphine


 Sulfate)  4 mg  Q4H  PRN


 IVP


 Severe Pain (Pain Scale 7-10)  7/5/20 11:15


 7/7/20 11:14  7/5/20 14:23


 


 


 Ondansetron HCl


  (Zofran)  4 mg  Q6H  PRN


 IVP


 Nausea & Vomiting  7/5/20 11:15


 7/30/20 11:14   


 


 


 Polyethylene


 Glycol


  (Miralax)  17 gm  HSPRN  PRN


 ORAL


 Constipation  7/5/20 11:15


 8/4/20 11:14   


 








Allergies:  


Coded Allergies:  


     No Known Allergies (Verified , 6/7/11)


ROS Limited/Unobtainable:  No


Constitutional:  Reports: no symptoms


HEENT:  Reports: no symptoms


Cardiovascular:  Reports: no symptoms


Respiratory:  Reports: no symptoms


Gastrointestinal/Abdominal:  Reports: no symptoms


Genitourinary:  Reports: no symptoms


Neurologic/Psychiatric:  Reports: no symptoms


Subjective


63 YO F admitted with back pain.  Now lumbar stenosis. Cover for Int Med-Dr Munroe





Objective





Last Vital Signs








  Date Time  Temp Pulse Resp B/P (MAP) Pulse Ox O2 Delivery O2 Flow Rate FiO2


 


7/5/20 12:20 97.8 77 20 167/86 (113) 100   


 


7/5/20 09:00      Nasal Cannula 3.0 











Laboratory Tests








Test


  7/4/20


20:50 7/5/20


06:05 7/5/20


12:21


 


POC Whole Blood Glucose


  Pending  


  


  293 MG/DL


()  H


 


White Blood Count


  


  8.2 K/UL


(4.8-10.8) 


 


 


Red Blood Count


  


  4.10 M/UL


(4.20-5.40)  L 


 


 


Hemoglobin


  


  13.2 G/DL


(12.0-16.0) 


 


 


Hematocrit


  


  39.9 %


(37.0-47.0) 


 


 


Mean Corpuscular Volume  97 FL (80-99)   


 


Mean Corpuscular Hemoglobin


  


  32.1 PG


(27.0-31.0)  H 


 


 


Mean Corpuscular Hemoglobin


Concent 


  33.0 G/DL


(32.0-36.0) 


 


 


Red Cell Distribution Width


  


  11.5 %


(11.6-14.8)  L 


 


 


Platelet Count


  


  292 K/UL


(150-450) 


 


 


Mean Platelet Volume


  


  7.5 FL


(6.5-10.1) 


 


 


Neutrophils (%) (Auto)


  


  78.7 %


(45.0-75.0)  H 


 


 


Lymphocytes (%) (Auto)


  


  13.2 %


(20.0-45.0)  L 


 


 


Monocytes (%) (Auto)


  


  6.3 %


(1.0-10.0) 


 


 


Eosinophils (%) (Auto)


  


  1.3 %


(0.0-3.0) 


 


 


Basophils (%) (Auto)


  


  0.6 %


(0.0-2.0) 


 


 


Sodium Level


  


  139 MMOL/L


(136-145) 


 


 


Potassium Level


  


  4.4 MMOL/L


(3.5-5.1) 


 


 


Chloride Level


  


  103 MMOL/L


() 


 


 


Carbon Dioxide Level


  


  28 MMOL/L


(21-32) 


 


 


Anion Gap


  


  8 mmol/L


(5-15) 


 


 


Blood Urea Nitrogen


  


  17 mg/dL


(7-18) 


 


 


Creatinine


  


  0.8 MG/DL


(0.55-1.30) 


 


 


Estimat Glomerular Filtration


Rate 


  > 60 mL/min


(>60) 


 


 


Glucose Level


  


  323 MG/DL


()  H 


 


 


Calcium Level


  


  8.9 MG/DL


(8.5-10.1) 


 











Microbiology








 Date/Time


Source Procedure


Growth Status


 


 


 7/2/20 18:00


Nasopharynx Coronavirus COVID-19 PCR ([facility]) - Final Complete

















Intake and Output  


 


 7/4/20 7/5/20





 19:00 07:00


 


Intake Total 490 ml 600 ml


 


Output Total 850 ml 800 ml


 


Balance -360 ml -200 ml


 


  


 


Intake Oral 490 ml 600 ml


 


Output Urine Total 850 ml 800 ml








Objective


Objective


General: No acute distress, awake and alert


HEENT: NCAT, sclera anicteric, PERRL, EOMI.


Neck: Supple, no significant jugular venous distention, 


Lungs: Fair inspiratory effort, clear to auscultation bilaterally, no Wheeze or 

Rales.


Heart: Regular rate and rhythm, normal S1/S2, no murmurs, distant heart sound.


Abdomen: soft, nontender, nondistended. Normoactive bowel sounds, Morbid 

obesity.


Extremities: No Cyanosis , clubbing or edema. 


Neuro: A&O x 3, Able to move all extremities, Left side weaker than right side.


Psych: Normal mood and affect





Assessment/Plan


1. Lumbar pain 2/2 severe left and moderate right foraminal narrowing with 

abutment of the exiting left L5 nerve root


2. Chest pain with intermediate troponin.


3. Diabetes.


4. Hypertension.


5. History of trigeminal neuralgia.


6. Hemochromatosis.





PLAN:


1. In Telemetry.


2. Cardiology consult.


3. Neurology consult.


4. Flexeril 10 mg p.o. TID


5. MRI of spine (C/T/L) - shows  severe left and moderate right foraminal 

narrowing with abutment of the exiting left L5 nerve root


6. PT evaluation 


7. On aspirin.


8. Full code.


9. DVT prophylaxis: Lovenox SQ


10. trial of Decadron taper











Regan Velasco MD Jul 5, 2020 15:44

## 2020-07-05 NOTE — CARDIOLOGY PROGRESS NOTE
Assessment/Plan


Assessment/Plan


 no new developments from cardiac standpoint





Subjective


Subjective


alert, apprehensive, denied chest pain, has dyspnea





Objective





Last 24 Hour Vital Signs








  Date Time  Temp Pulse Resp B/P (MAP) Pulse Ox O2 Delivery O2 Flow Rate FiO2


 


7/5/20 12:20 97.8 77 20 167/86 (113) 100   


 


7/5/20 10:37 97.9       


 


7/5/20 09:59  80  142/86    


 


7/5/20 09:59    142/86    


 


7/5/20 09:00      Nasal Cannula 3.0 


 


7/5/20 08:00 97.9 80 18 142/86 (104) 99   


 


7/5/20 08:00  76      


 


7/5/20 04:00 97.0 76 18 151/73 (99) 96   


 


7/5/20 04:00  72      


 


7/5/20 00:00 97.4 78 18 157/77 (103) 98   


 


7/5/20 00:00  80      


 


7/4/20 21:00      Nasal Cannula 2.0 


 


7/4/20 20:00 97.9 85 18 156/80 (105) 98   


 


7/4/20 20:00  88      








General Appearance:  no apparent distress


EENT:  PERRL/EOMI


Neck:  supple


Rhythm:  NSR


Cardiovascular:  normal rate


Respiratory/Chest:  lungs clear


Abdomen:  soft


Extremities:  non-tender











Intake and Output  


 


 7/4/20 7/5/20





 19:00 07:00


 


Intake Total 490 ml 600 ml


 


Output Total 850 ml 800 ml


 


Balance -360 ml -200 ml


 


  


 


Intake Oral 490 ml 600 ml


 


Output Urine Total 850 ml 800 ml











Laboratory Tests








Test


  7/4/20


20:50 7/5/20


06:05 7/5/20


12:21 7/5/20


15:48


 


POC Whole Blood Glucose


  Pending  


  


  293 MG/DL


()  H 216 MG/DL


()  H


 


White Blood Count


  


  8.2 K/UL


(4.8-10.8) 


  


 


 


Red Blood Count


  


  4.10 M/UL


(4.20-5.40)  L 


  


 


 


Hemoglobin


  


  13.2 G/DL


(12.0-16.0) 


  


 


 


Hematocrit


  


  39.9 %


(37.0-47.0) 


  


 


 


Mean Corpuscular Volume  97 FL (80-99)    


 


Mean Corpuscular Hemoglobin


  


  32.1 PG


(27.0-31.0)  H 


  


 


 


Mean Corpuscular Hemoglobin


Concent 


  33.0 G/DL


(32.0-36.0) 


  


 


 


Red Cell Distribution Width


  


  11.5 %


(11.6-14.8)  L 


  


 


 


Platelet Count


  


  292 K/UL


(150-450) 


  


 


 


Mean Platelet Volume


  


  7.5 FL


(6.5-10.1) 


  


 


 


Neutrophils (%) (Auto)


  


  78.7 %


(45.0-75.0)  H 


  


 


 


Lymphocytes (%) (Auto)


  


  13.2 %


(20.0-45.0)  L 


  


 


 


Monocytes (%) (Auto)


  


  6.3 %


(1.0-10.0) 


  


 


 


Eosinophils (%) (Auto)


  


  1.3 %


(0.0-3.0) 


  


 


 


Basophils (%) (Auto)


  


  0.6 %


(0.0-2.0) 


  


 


 


Sodium Level


  


  139 MMOL/L


(136-145) 


  


 


 


Potassium Level


  


  4.4 MMOL/L


(3.5-5.1) 


  


 


 


Chloride Level


  


  103 MMOL/L


() 


  


 


 


Carbon Dioxide Level


  


  28 MMOL/L


(21-32) 


  


 


 


Anion Gap


  


  8 mmol/L


(5-15) 


  


 


 


Blood Urea Nitrogen


  


  17 mg/dL


(7-18) 


  


 


 


Creatinine


  


  0.8 MG/DL


(0.55-1.30) 


  


 


 


Estimat Glomerular Filtration


Rate 


  > 60 mL/min


(>60) 


  


 


 


Glucose Level


  


  323 MG/DL


()  H 


  


 


 


Calcium Level


  


  8.9 MG/DL


(8.5-10.1) 


  


 











Microbiology








 Date/Time


Source Procedure


Growth Status


 


 


 7/2/20 18:00


Nasopharynx Coronavirus COVID-19 PCR ([facility]) - Final Complete

















Charu Garcia MD Jul 5, 2020 17:43

## 2020-07-05 NOTE — NUR
NURSE NOTES:

Handoff received from Manuel ORTIZ.  Patient arrived safely from [facility] via patient bed.  Patient 
is awake and alert, no signs of distress noted.  Left FA 22g is patent and intact, saline 
locked.  Siderails are padded for seizure precautions.  Patient states that she is feeling 
better, no complaints of pain at this time.  Bed is low and locked, side rails up x3, call 
light is within reach.

## 2020-07-05 NOTE — NUR
TRANSFER TO FLOOR:

Patient transferred to 3E, per MD.   Report given to Shmuel Keene RN.  Belongings and 
medications given to Shmuel Keene RN. Family and or S/O informed of transfer.  Left 
message on voicemail of Mitesh Carpio, Contact/Son.

## 2020-07-06 VITALS — DIASTOLIC BLOOD PRESSURE: 78 MMHG | SYSTOLIC BLOOD PRESSURE: 143 MMHG

## 2020-07-06 VITALS — SYSTOLIC BLOOD PRESSURE: 133 MMHG | DIASTOLIC BLOOD PRESSURE: 73 MMHG

## 2020-07-06 VITALS — SYSTOLIC BLOOD PRESSURE: 161 MMHG | DIASTOLIC BLOOD PRESSURE: 87 MMHG

## 2020-07-06 VITALS — DIASTOLIC BLOOD PRESSURE: 87 MMHG | SYSTOLIC BLOOD PRESSURE: 158 MMHG

## 2020-07-06 VITALS — DIASTOLIC BLOOD PRESSURE: 90 MMHG | SYSTOLIC BLOOD PRESSURE: 169 MMHG

## 2020-07-06 VITALS — SYSTOLIC BLOOD PRESSURE: 165 MMHG | DIASTOLIC BLOOD PRESSURE: 88 MMHG

## 2020-07-06 VITALS — DIASTOLIC BLOOD PRESSURE: 84 MMHG | SYSTOLIC BLOOD PRESSURE: 151 MMHG

## 2020-07-06 VITALS — SYSTOLIC BLOOD PRESSURE: 121 MMHG | DIASTOLIC BLOOD PRESSURE: 65 MMHG

## 2020-07-06 VITALS — SYSTOLIC BLOOD PRESSURE: 169 MMHG | DIASTOLIC BLOOD PRESSURE: 94 MMHG

## 2020-07-06 VITALS — DIASTOLIC BLOOD PRESSURE: 79 MMHG | SYSTOLIC BLOOD PRESSURE: 150 MMHG

## 2020-07-06 VITALS — SYSTOLIC BLOOD PRESSURE: 172 MMHG | DIASTOLIC BLOOD PRESSURE: 90 MMHG

## 2020-07-06 LAB
ADD MANUAL DIFF: NO
ANION GAP SERPL CALC-SCNC: 8 MMOL/L (ref 5–15)
BASOPHILS NFR BLD AUTO: 0.6 % (ref 0–2)
BUN SERPL-MCNC: 20 MG/DL (ref 7–18)
CALCIUM SERPL-MCNC: 8.4 MG/DL (ref 8.5–10.1)
CHLORIDE SERPL-SCNC: 102 MMOL/L (ref 98–107)
CO2 SERPL-SCNC: 29 MMOL/L (ref 21–32)
CREAT SERPL-MCNC: 0.7 MG/DL (ref 0.55–1.3)
EOSINOPHIL NFR BLD AUTO: 2.2 % (ref 0–3)
ERYTHROCYTE [DISTWIDTH] IN BLOOD BY AUTOMATED COUNT: 11.8 % (ref 11.6–14.8)
HCT VFR BLD CALC: 41.3 % (ref 37–47)
HGB BLD-MCNC: 13.5 G/DL (ref 12–16)
LYMPHOCYTES NFR BLD AUTO: 19.1 % (ref 20–45)
MCV RBC AUTO: 98 FL (ref 80–99)
MONOCYTES NFR BLD AUTO: 8.3 % (ref 1–10)
NEUTROPHILS NFR BLD AUTO: 69.7 % (ref 45–75)
PLATELET # BLD: 297 K/UL (ref 150–450)
POTASSIUM SERPL-SCNC: 4.3 MMOL/L (ref 3.5–5.1)
RBC # BLD AUTO: 4.21 M/UL (ref 4.2–5.4)
SODIUM SERPL-SCNC: 139 MMOL/L (ref 136–145)
WBC # BLD AUTO: 6.6 K/UL (ref 4.8–10.8)

## 2020-07-06 RX ADMIN — HYDRALAZINE HYDROCHLORIDE PRN MG: 25 TABLET ORAL at 14:56

## 2020-07-06 RX ADMIN — MORPHINE SULFATE PRN MG: 4 INJECTION INTRAVENOUS at 15:04

## 2020-07-06 RX ADMIN — INSULIN ASPART SCH UNITS: 100 INJECTION, SOLUTION INTRAVENOUS; SUBCUTANEOUS at 12:27

## 2020-07-06 RX ADMIN — HYDRALAZINE HYDROCHLORIDE PRN MG: 25 TABLET ORAL at 21:30

## 2020-07-06 RX ADMIN — INSULIN ASPART SCH UNITS: 100 INJECTION, SOLUTION INTRAVENOUS; SUBCUTANEOUS at 05:45

## 2020-07-06 RX ADMIN — MORPHINE SULFATE PRN MG: 4 INJECTION INTRAVENOUS at 23:50

## 2020-07-06 RX ADMIN — CYCLOBENZAPRINE HYDROCHLORIDE SCH MG: 10 TABLET, FILM COATED ORAL at 09:07

## 2020-07-06 RX ADMIN — ASPIRIN 81 MG SCH MG: 81 TABLET ORAL at 09:06

## 2020-07-06 RX ADMIN — CYCLOBENZAPRINE HYDROCHLORIDE SCH MG: 10 TABLET, FILM COATED ORAL at 17:19

## 2020-07-06 RX ADMIN — CYCLOBENZAPRINE HYDROCHLORIDE SCH MG: 10 TABLET, FILM COATED ORAL at 14:08

## 2020-07-06 RX ADMIN — INSULIN ASPART SCH UNITS: 100 INJECTION, SOLUTION INTRAVENOUS; SUBCUTANEOUS at 21:34

## 2020-07-06 RX ADMIN — MORPHINE SULFATE PRN MG: 4 INJECTION INTRAVENOUS at 20:06

## 2020-07-06 RX ADMIN — INSULIN ASPART SCH UNITS: 100 INJECTION, SOLUTION INTRAVENOUS; SUBCUTANEOUS at 16:29

## 2020-07-06 RX ADMIN — ENALAPRIL MALEATE SCH MG: 5 TABLET ORAL at 09:07

## 2020-07-06 RX ADMIN — ENOXAPARIN SODIUM SCH MG: 40 INJECTION SUBCUTANEOUS at 09:07

## 2020-07-06 RX ADMIN — INSULIN ASPART SCH UNITS: 100 INJECTION, SOLUTION INTRAVENOUS; SUBCUTANEOUS at 17:05

## 2020-07-06 RX ADMIN — INSULIN ASPART SCH UNITS: 100 INJECTION, SOLUTION INTRAVENOUS; SUBCUTANEOUS at 05:43

## 2020-07-06 RX ADMIN — MORPHINE SULFATE PRN MG: 4 INJECTION INTRAVENOUS at 04:55

## 2020-07-06 NOTE — NUR
*-*DISCHARGE PLANNED*-*



PATIENT HAS BEEN ACCEPTED AND WILL BE DISCHARGED TO:



[facility]

 P: [phone] FOR NURSE TO NURSE REPORT

ROOM# 15.A SKILLED

[facility]SP[facility] SET FOR 3PM, S/W [facility]    

PATIENT WILL NOTIFY FAMILY ABOUT DISCHARGE PLAN.

## 2020-07-06 NOTE — CARDIOLOGY PROGRESS NOTE
Assessment/Plan


Assessment/Plan


1. Left chest wall pain.


2. Leg cramps.


3. Diabetes mellitus.


4. Hypertension.


5. Diverticulosis.


6. Cervical spine and lumbar spine radiculopathy








still with tenderness / pain 


trop relatively flat will repeat level tonite en in am 


duplex neg 


spin report noted 


echo reviewed personally wall motion is normal 


xrayof rib and sternum no fx


tenderness improvedc





Subjective


Cardiovascular:  Reports: chest pain - imporved


Respiratory:  Reports: shortness of breath


Gastrointestinal/Abdominal:  Denies: abdominal pain


Genitourinary:  Denies: burning


Subjective


tenderness now better as of today





Objective





Last 24 Hour Vital Signs








  Date Time  Temp Pulse Resp B/P (MAP) Pulse Ox O2 Delivery O2 Flow Rate FiO2


 


7/6/20 17:39  92  158/87 (110)    


 


7/6/20 17:08  95  161/87 (111)    


 


7/6/20 16:27    169/90    


 


7/6/20 16:00 98.4 94 16 169/90 (116) 96   


 


7/6/20 15:39  92  165/88 (113)    


 


7/6/20 14:56    172/90    


 


7/6/20 14:55  99  172/90 (117)    


 


7/6/20 12:00 97.7 85 16 150/79 (102) 96   


 


7/6/20 09:07    121/65    


 


7/6/20 09:06  86  121/65    


 


7/6/20 09:00      Nasal Cannula 2.0 


 


7/6/20 08:00 97.7 86 16 121/65 (83) 98   


 


7/6/20 05:25 97.4       


 


7/6/20 04:00 97.4 79 18 143/78 (99) 99   


 


7/6/20 00:00 98.4 87 17 133/73 (93) 96   


 


7/5/20 21:00      Nasal Cannula 3.0 


 


7/5/20 20:00 97.6 104 18 139/78 (98) 98   








General Appearance:  no apparent distress, alert


Cardiovascular:  normal rate, regular rhythm


Respiratory/Chest:  lungs clear, normal breath sounds


Abdomen:  normal bowel sounds, non tender, soft


Extremities:  no swelling











Intake and Output  


 


 7/5/20 7/6/20





 19:00 07:00


 


Intake Total 400 ml 800 ml


 


Output Total 500 ml 900 ml


 


Balance -100 ml -100 ml


 


  


 


Intake Oral 400 ml 800 ml


 


Output Urine Total 500 ml 900 ml











Laboratory Tests








Test


  7/5/20


21:22 7/6/20


04:00 7/6/20


05:38 7/6/20


11:56


 


POC Whole Blood Glucose


  303 MG/DL


()  H 


  268 MG/DL


()  H Pending  


 


 


White Blood Count


  


  6.6 K/UL


(4.8-10.8) 


  


 


 


Red Blood Count


  


  4.21 M/UL


(4.20-5.40) 


  


 


 


Hemoglobin


  


  13.5 G/DL


(12.0-16.0) 


  


 


 


Hematocrit


  


  41.3 %


(37.0-47.0) 


  


 


 


Mean Corpuscular Volume  98 FL (80-99)    


 


Mean Corpuscular Hemoglobin


  


  32.1 PG


(27.0-31.0)  H 


  


 


 


Mean Corpuscular Hemoglobin


Concent 


  32.7 G/DL


(32.0-36.0) 


  


 


 


Red Cell Distribution Width


  


  11.8 %


(11.6-14.8) 


  


 


 


Platelet Count


  


  297 K/UL


(150-450) 


  


 


 


Mean Platelet Volume


  


  7.4 FL


(6.5-10.1) 


  


 


 


Neutrophils (%) (Auto)


  


  69.7 %


(45.0-75.0) 


  


 


 


Lymphocytes (%) (Auto)


  


  19.1 %


(20.0-45.0)  L 


  


 


 


Monocytes (%) (Auto)


  


  8.3 %


(1.0-10.0) 


  


 


 


Eosinophils (%) (Auto)


  


  2.2 %


(0.0-3.0) 


  


 


 


Basophils (%) (Auto)


  


  0.6 %


(0.0-2.0) 


  


 


 


Sodium Level


  


  139 MMOL/L


(136-145) 


  


 


 


Potassium Level


  


  4.3 MMOL/L


(3.5-5.1) 


  


 


 


Chloride Level


  


  102 MMOL/L


() 


  


 


 


Carbon Dioxide Level


  


  29 MMOL/L


(21-32) 


  


 


 


Anion Gap


  


  8 mmol/L


(5-15) 


  


 


 


Blood Urea Nitrogen


  


  20 mg/dL


(7-18)  H 


  


 


 


Creatinine


  


  0.7 MG/DL


(0.55-1.30) 


  


 


 


Estimat Glomerular Filtration


Rate 


  > 60 mL/min


(>60) 


  


 


 


Glucose Level


  


  271 MG/DL


()  H 


  


 


 


Calcium Level


  


  8.4 MG/DL


(8.5-10.1)  L 


  


 


 


Test


  7/6/20


16:29 


  


  


 


 


POC Whole Blood Glucose


  134 MG/DL


()  H 


  


  


 

















Santo Sebastian MD Jul 6, 2020 18:30

## 2020-07-06 NOTE — NUR
NURSE NOTES:

Patient's blood pressure 169/94 on EMS arrival. Called and spoke with Valentina at [facility], per Valentina, facility will not accept patient "until the blood pressure is 
stable." Called and notified Dr. Munroe facility is refusing patient and notified MD of 
current blood pressure, per Dr. Munroe "that's ok. Discharge her tomorrow morning." Charge 
nurse notified. 


-------------------------------------------------------------------------------

Addendum: 07/06/20 at 1947 by Julie Kocher, RN

-------------------------------------------------------------------------------

Add:

Patient is asymptomatic related to high blood pressure, denies headache, requesting pain 
medication for lower back pain, endorsed to Linnea ORTIZ.

## 2020-07-06 NOTE — NUR
NURSE NOTES:

Dr. Tavarez notified of elevated blood pressure s/p Hydralazine PRN. New order received and 
entered.

## 2020-07-06 NOTE — NUR
NURSE NOTES:

Patient states feels very hungry. Wasn't able to eat much of dinner, states she had sorbet 
and not much else. Given half chicken salad sandwich, milk, and apple juice.

## 2020-07-06 NOTE — NUR
NURSE NOTES:

Received report from DIANA Barrow. Patient alert, oriented, sitting up in bed. No distress 
noted. Saline lock in LFA, patent and intact. External urine catheter intact, draining clear 
yellow urine. Encouraged to call as needed for assistance. Bed in low position, locked, side 
rails up x3, padded. Call light within reach. Will continue to monitor.

## 2020-07-06 NOTE — NUR
DISCHARGE PLANNING

DC ORDER GIVEN 

CM SPOKE TO PATIENT AT LENGTH AT BEDSIDE

PATIENT AGREEABLE 

PATIENT TO MAKE F/U APPT WITH NEUROLOGIST REGARDING POSSIBLE SLE



PATIENT CALLING FAMILY TO INFORM OF DISCHARGE

PATIENT AGREEABLE

## 2020-07-06 NOTE — NUR
NURSE NOTES:

Patient's blood pressure 158/87. [facility] called for transport. ETA 45 mins to 1 hour.

## 2020-07-06 NOTE — NUR
NURSE NOTES:

BP reassessed s/p Hydralazine PRN. /88. Patient states her pain is improving s/p 
Morphine PRN.

## 2020-07-06 NOTE — CONSULTATION
DATE OF CONSULTATION:  07/01/2020

NEUROLOGICAL CONSULTATION



CONSULTING PHYSICIAN:  Aleksander Barrow MD



HISTORY OF PRESENT ILLNESS:  This is a 64-year-old  woman

with a history of seizures 20 years ago, but never treated, hereditary

hemochromatosis, diabetes for many years, hypertension, dysautonomia, and

trigeminal neuralgia, who was admitted with a chief complaint of

generalized body pain, severe muscle spasms, worsening over the past 3

days but started about a month ago.  She has had previous attacks off and

on 3 years ago.



I was asked to see the patient to rule out MS.  The patient's spasms are

daily, lasting 10 seconds at least, usually starting in her legs, more on

the right than the left side, and are severely painful.  The patient has

chest pain as well with tightness.  The patient has had daily headaches

and memory loss at least since January 2020.  She denies any seizures or

blackout spells.  She does have loss of smell, but not taste.  She has

blurred vision and now diplopia.  There is no good history of optic

neuritis.  However, she does have episodes of visual loss in the left eye,

which can last half a day.  She has paresthesias in the lower extremities,

right side greater than left since December 2019.  The patient has had

muscle weakness, mostly in her legs, some episodic tremors in her arms.

The muscles stiffen up with her spasms.  She denies any alcohol abuse,

tobacco use, or drug use.  There is no history of syphilis or gonorrhea.

She claims she had ongoing hyperthyroidism discovered 3 to 5 years ago.

She has some constipation.  She has daily constant neck and back pain.

She has never had any imaging or nerve conduction study and claims she has

never had MRI scan of her brain.  She denies a history of sickle cell

disease.  Her sister has a history of multiple sclerosis.  She has no

history of cardiac disease.



PAST MEDICAL HISTORY:

1. Appendectomy.

2. L5-S1 laminectomy, diskectomy, and foraminectomy.

3. Hepatitis C and took Harvoni.

4. Hypertension for 15 years.

5. Cervical spondylosis.

6. Osteoarthritis.

7. Hereditary hemochromatosis.



ALLERGIES:  She is allergic to doxycycline, Norco, and tramadol.



SOCIAL HISTORY:  She lives at home with her son.  She is not .  She

has a sister who allegedly has MS.



SURGERIES:  See above.



REVIEW OF SYSTEMS:  Appetite is good.  Weight stable at 158 pounds.  5 feet

7 inches tall.  See above for the rest of the review of systems.



PHYSICAL EXAMINATION:

GENERAL:  She is a well-developed, obese, elderly woman lying in bed, not

in distress, with painful stiffness of the lower extremities, especially

on the right side _____ lasting seconds.

VITAL SIGNS:  Blood pressure is 167/81, pulse is 115, temperature is 98.3

degrees, respiration rate is 20, pulse oximetry is 97%.

HEENT:  Examination of head is normal.

NECK:  There is posterior cervical tenderness.  No limitation of motion.

Carotids are +2 without any bruits.

LUNGS:  Basically clear.

CARDIOVASCULAR:  PMI cannot be felt.  JVP was not visualized.  The patient

had normal S1.  S2 is physiologically split.  I could not hear any murmurs

or rubs.

ABDOMEN:  The abdomen is obese.  Bowel sounds are intact.  No tenderness,

masses, or organomegaly.

BACK:  I could not examine.

EXTREMITIES:  Normal.

NEUROLOGIC:

MENTAL STATUS:  Judgment is fair.  Affect was appropriate.  Mood was

depressed.  Intellect, mild similarities were intact, i.e., train and

bicycle _____.  Orientation, time.  Initially, she thought the date was

02/27/2020, then said 01/27/2020, finally June 2020.  Hospital, initially

she said she was at [facility] and then [facility].  She did not know what

floor she was on.  She is oriented to person.  Language function, spoken

speech was fluent without paraphasias.  She could spell world backwards

and forwards.

CRANIAL NERVE EXAMINATION:  Cranial Nerve II:  Visual fields intact to

confrontation.  There is no afferent pupillary defect noted.



Cranial Nerves III, IV, and VI:  Extraocular motility was full.  Pupils are

approximately 4 mm, round, somewhat sluggishly reactive to light.



Cranial Nerve V:  There is decreased sensation on the right side of the

face to fine touch in the first, second, and third divisions of the 5th

cranial nerve.



CRANIAL NERVE VII:  Facial strength 5/5.



CRANIAL NERVE VIII:  Auditory acuity is intact bilaterally.



CRANIAL NERVE IX AND X:  Not tested.



CRANIAL NERVES XI AND XII:  Intact.

MUSCLE EXAMINATION:  She initially had lead-pipe rigidity with her spasms

_____.  This was mainly in her legs.  Bulk was symmetrical bilaterally.

Strength is difficult to evaluate, was at least 4/5.  There was some

tremor noted in the right upper extremity.  Reflexes are +1 in the upper

extremities, 0 at the knees and ankles, with indefinite toe on the right

side and downgoing toe on the left side.

COORDINATION:  Finger-to-nose revealed dysmetria and she could not do rapid

alternating movements.  Heel-to-shin testing could not be done.

GAIT AND STATION:  Not tested.  Patient uses a walker.

SENSORY EXAMINATION:  Difficult to evaluate.  Decreased pinprick and fine

touch in the right lower extremity.



LABORATORY DATA:  The patient's MRI of the cervical and thoracic spinal

cord was normal, although it was a noncontrast study.  Chest x-ray was

unremarkable.  She did have a venous duplex of the lower extremities,

which was normal.  _____ is almost totally normal.  Her sed rate was 15.

The HAL screen is positive, level not done.  The B12 level was over 700.

Renal function is intact.  C-reactive protein is a little elevated.

Troponin is mildly elevated.



IMPRESSION:  The fact that this patient has multiple sclerosis and the fact

that she has a positive HAL did suggest that she has SLE, which could

explain her "seizures" in the past, although she states she never got

studies in the past, nor was she ever treated for seizures in the past.  I

doubt that this is stiff-man syndrome, it is obviously not.  There is no

trismus.  A stiff-man or stiff-person syndrome can be related to a

paraneoplastic syndrome, but I doubt it.  The best test for multiple

sclerosis would actually be an MRI scan of the brain with and without

gadolinium.  If the MRI of the brain is negative, cervical spine is

negative, thoracic spinal cord is negative, then the chances of her having

multiple sclerosis is pretty low, although we do want to do a lumbar

puncture and do an MS panel.



The patient also has evidence for peripheral nerve disease, large fiber,

which is most likely related to her diabetes.  Hemochromatosis has not

been confirmed.  I do not see any strong evidence _____ as well.



PLAN:

1. MRI scan of the brain without gadolinium _____.

2. Follow up on possible SLE.

3. We will try to get old records if available.



Thanks for this interesting case.









  ______________________________________________

  Aleksander MD Danial





DR:  AURORA

D:  07/02/2020 09:42

T:  07/02/2020 21:34

JOB#:  [id number]

CC:

## 2020-07-06 NOTE — GENERAL PROGRESS NOTE
Assessment/Plan


Problem List:  


(1) ACS (acute coronary syndrome)


ICD Codes:  I24.9 - Acute ischemic heart disease, unspecified


SNOMED:  738281798


(2) History of diabetes mellitus


ICD Codes:  Z86.39 - Personal history of other endocrine, nutritional and 

metabolic disease


SNOMED:  584828838


(3) History of TIAs


ICD Codes:  Z86.73 - Personal history of transient ischemic attack (TIA), and 

cerebral infarction without residual deficits


SNOMED:  448689705


(4) History of hypertension


ICD Codes:  Z86.79 - Personal history of other diseases of the circulatory 

system


SNOMED:  779486667


Assessment/Plan:


increase Levemir to 60 units qhs 


increase Novolog to 20 units ac tid 


continue Novolog sliding scale ac  hs





Subjective


Allergies:  


Coded Allergies:  


     No Known Allergies (Verified , 6/7/11)


All Systems:  reviewed and negative except above


Subjective


events noted 


glucose values are still elevated despite addition of high dose insulin regimen 











Item Value  Date Time


 


Bedside Blood Glucose 268 mg/dl H 7/6/20 0630


 


Bedside Blood Glucose 303 mg/dl H 7/5/20 2127


 


Bedside Blood Glucose 216 mg/dl H 7/5/20 1703


 


Bedside Blood Glucose 293 mg/dl H 7/5/20 1228


 


Bedside Blood Glucose 284 mg/dl H 7/5/20 0607











Objective





Last 24 Hour Vital Signs








  Date Time  Temp Pulse Resp B/P (MAP) Pulse Ox O2 Delivery O2 Flow Rate FiO2


 


7/6/20 05:25 97.4       


 


7/6/20 04:00 97.4 79 18 143/78 (99) 99   


 


7/6/20 00:00 98.4 87 17 133/73 (93) 96   


 


7/5/20 21:00      Nasal Cannula 3.0 


 


7/5/20 20:00 97.6 104 18 139/78 (98) 98   


 


7/5/20 16:00 98.0 105 20 140/88 (105) 99   


 


7/5/20 12:20 97.8 77 20 167/86 (113) 100   


 


7/5/20 10:37 97.9       


 


7/5/20 09:59  80  142/86    


 


7/5/20 09:59    142/86    


 


7/5/20 09:00      Nasal Cannula 3.0 


 


7/5/20 08:00 97.9 80 18 142/86 (104) 99   


 


7/5/20 08:00  76      

















Intake and Output  


 


 7/5/20 7/6/20





 19:00 07:00


 


Intake Total 400 ml 800 ml


 


Output Total 500 ml 900 ml


 


Balance -100 ml -100 ml


 


  


 


Intake Oral 400 ml 800 ml


 


Output Urine Total 500 ml 900 ml








Laboratory Tests


7/5/20 12:21: POC Whole Blood Glucose 293H


7/5/20 15:48: POC Whole Blood Glucose 216H


7/5/20 21:22: POC Whole Blood Glucose 303H


7/6/20 04:00: 


White Blood Count [Pending], Red Blood Count [Pending], Hemoglobin [Pending], 

Hematocrit [Pending], Mean Corpuscular Volume [Pending], Mean Corpuscular 

Hemoglobin [Pending], Mean Corpuscular Hemoglobin Concent [Pending], Red Cell 

Distribution Width [Pending], Platelet Count [Pending], Mean Platelet Volume [

Pending], Neutrophils (%) (Auto) [Pending], Lymphocytes (%) (Auto) [Pending], 

Monocytes (%) (Auto) [Pending], Eosinophils (%) (Auto) [Pending], Basophils (%) 

(Auto) [Pending], Sodium Level [Pending], Potassium Level [Pending], Chloride 

Level [Pending], Carbon Dioxide Level [Pending], Blood Urea Nitrogen [Pending], 

Creatinine [Pending], Estimat Glomerular Filtration Rate [Pending], Glucose 

Level [Pending], Calcium Level [Pending]


7/6/20 05:38: POC Whole Blood Glucose 268H


Height (Feet):  5


Height (Inches):  8.00


Weight (Pounds):  166


General Appearance:  no apparent distress


Cardiovascular:  normal rate


Respiratory/Chest:  lungs clear


Abdomen:  normal bowel sounds


Objective





Current Medications








 Medications


  (Trade)  Dose


 Ordered  Sig/Toni


 Route


 PRN Reason  Start Time


 Stop Time Status Last Admin


Dose Admin


 


 Acetaminophen


  (Tylenol)  650 mg  Q4H  PRN


 ORAL


 Mild Pain (Pain Scale 1-3)  7/5/20 11:00


 7/30/20 10:59   


 


 


 Amlodipine


 Besylate


  (Norvasc)  5 mg  DAILY


 ORAL


   7/6/20 09:00


 8/2/20 08:59   


 


 


 Aspirin


  (ASA)  81 mg  DAILY


 ORAL


   7/6/20 09:00


 8/14/20 12:29   


 


 


 Cyclobenzaprine


 HCl


  (Flexeril)  10 mg  THREE TIMES A  DAY


 ORAL


   7/5/20 13:00


 7/31/20 22:14  7/5/20 18:27


 


 


 Dextrose


  (Dextrose 50%)  25 ml  Q30M  PRN


 IV


 Hypoglycemia  7/5/20 11:00


 10/2/20 15:29   


 


 


 Dextrose


  (Dextrose 50%)  50 ml  Q30M  PRN


 IV


 Hypoglycemia  7/5/20 11:00


 10/2/20 15:29   


 


 


 Enalapril Maleate


  (Vasotec)  10 mg  DAILY


 ORAL


   7/6/20 09:00


 7/30/20 12:14   


 


 


 Enoxaparin Sodium


  (Lovenox)  40 mg  Q24H


 SUBQ


   7/6/20 09:00


 9/28/20 08:59   


 


 


 Hydralazine HCl


  (Apresoline)  25 mg  Q6H  PRN


 ORAL


 For High Blood Pressure  7/5/20 16:30


 9/28/20 04:29   


 


 


 Insulin Aspart


  (NovoLOG)    BEFORE MEALS AND  HS


 SUBQ


   7/5/20 11:30


 9/28/20 06:29  7/6/20 05:45


 


 


 Insulin Aspart


  (NovoLOG)  16 units  NOVOTIAC


 SUBQ


   7/5/20 16:50


 10/2/20 16:49  7/6/20 05:43


 


 


 Insulin Detemir


  (Levemir)  44 units  BEDTIME


 SUBQ


   7/5/20 21:00


 10/2/20 20:59  7/5/20 21:25


 


 


 Methylprednisolone


  (Medrol)  4 mg  ACBREAKFAST


 ORAL


   7/6/20 06:30


 7/9/20 08:30  7/6/20 05:41


 


 


 Methylprednisolone


  (Medrol)  4 mg  QHS


 ORAL


   7/5/20 21:00


 7/6/20 22:00  7/5/20 21:19


 


 


 Morphine Sulfate


  (Morphine


 Sulfate)  1 mg  Q4H  PRN


 IVP


 Moderate Pain (Pain Scale 4-6)  7/5/20 11:15


 7/7/20 11:14   


 


 


 Morphine Sulfate


  (Morphine


 Sulfate)  4 mg  Q4H  PRN


 IVP


 Severe Pain (Pain Scale 7-10)  7/5/20 11:15


 7/7/20 11:14  7/6/20 04:55


 


 


 Ondansetron HCl


  (Zofran)  4 mg  Q6H  PRN


 IVP


 Nausea & Vomiting  7/5/20 11:15


 7/30/20 11:14   


 


 


 Polyethylene


 Glycol


  (Miralax)  17 gm  HSPRN  PRN


 ORAL


 Constipation  7/5/20 11:15


 8/4/20 11:14   


 

















Vasile Simeon MD Jul 6, 2020 06:50

## 2020-07-06 NOTE — NUR
NURSE NOTES:

Received report from Batsheva. Patient is asleep during rounds, in no apparent distress, RR 
even and unlabored, on 3L NC. Fall precautions maintained. Side rails upx2, bed low and 
locked, call light within reach, bed alarm armed.

## 2020-07-06 NOTE — NUR
NURSE NOTES:

EMS arrived to transport patient, VS taken, /92. Hydralazine PRN given for high BP and 
morphine PRN given for pain. [facility] EMS crew informed that RN will call. 


-------------------------------------------------------------------------------

Addendum: 07/06/20 at 1509 by Julie Kocher, RN

-------------------------------------------------------------------------------

Edit:

/90

## 2020-07-06 NOTE — DISCHARGE SUMMARY
Discharge Summary


Hospital Course


Date of Admission


Jun 30, 2020 at 02:00


Date of Discharge





Admitting Diagnosis


chest pain, ACS


HPI


Yaquelin Banks is a 64 year old female who was admitted on Jun 30, 2020 at 02:

00 for Chest Pain, Acute Coronary Syndrome





Discharge


Discharge Vital Signs





Last Vital Signs








  Date Time  Temp Pulse Resp B/P (MAP) Pulse Ox O2 Delivery O2 Flow Rate FiO2


 


7/6/20 17:39  92  158/87 (110)    


 


7/6/20 16:00 98.4  16  96   


 


7/6/20 09:00      Nasal Cannula 2.0 








Discharge Disposition


Patient was discharged to











Ketan Munroe MD Jul 6, 2020 19:03

## 2020-07-06 NOTE — PULMONOLOGY PROGRESS NOTE
Subjective


ROS Limited/Unobtainable:  No


Interval Events:  c/o cramps in legs


Constitutional:  Reports: no symptoms


HEENT:  Repors: no symptoms


Allergies:  


Coded Allergies:  


     No Known Allergies (Verified , 6/7/11)


All Systems:  reviewed and negative except above





Objective





Last 24 Hour Vital Signs








  Date Time  Temp Pulse Resp B/P (MAP) Pulse Ox O2 Delivery O2 Flow Rate FiO2


 


7/6/20 09:07    121/65    


 


7/6/20 09:06  86  121/65    


 


7/6/20 09:00      Nasal Cannula 2.0 


 


7/6/20 08:00 97.7 86 16 121/65 (83) 98   


 


7/6/20 05:25 97.4       


 


7/6/20 04:00 97.4 79 18 143/78 (99) 99   


 


7/6/20 00:00 98.4 87 17 133/73 (93) 96   


 


7/5/20 21:00      Nasal Cannula 3.0 


 


7/5/20 20:00 97.6 104 18 139/78 (98) 98   


 


7/5/20 16:00 98.0 105 20 140/88 (105) 99   

















Intake and Output  


 


 7/5/20 7/6/20





 19:00 07:00


 


Intake Total 400 ml 800 ml


 


Output Total 500 ml 900 ml


 


Balance -100 ml -100 ml


 


  


 


Intake Oral 400 ml 800 ml


 


Output Urine Total 500 ml 900 ml








General Appearance:  WD/WN


HEENT:  normocephalic, atraumatic, PERRL, supple


Respiratory:  chest wall non-tender, lungs clear


Cardiovascular:  normal rate


Abdomen:  normal bowel sounds, soft, non tender, non distended


Extremities:  no cyanosis, no edema, pedal pulses normal


Skin:  no rash


Neurologic:  alert, oriented x 3, responsive


Musculoskeletal:  normal muscle bulk


Laboratory Tests


7/5/20 15:48: POC Whole Blood Glucose 216H


7/5/20 21:22: POC Whole Blood Glucose 303H


7/6/20 04:00: 


White Blood Count 6.6, Red Blood Count 4.21, Hemoglobin 13.5, Hematocrit 41.3, 

Mean Corpuscular Volume 98, Mean Corpuscular Hemoglobin 32.1H, Mean Corpuscular 

Hemoglobin Concent 32.7, Red Cell Distribution Width 11.8, Platelet Count 297, 

Mean Platelet Volume 7.4, Neutrophils (%) (Auto) 69.7, Lymphocytes (%) (Auto) 

19.1L, Monocytes (%) (Auto) 8.3, Eosinophils (%) (Auto) 2.2, Basophils (%) (Auto

) 0.6, Sodium Level 139, Potassium Level 4.3, Chloride Level 102, Carbon 

Dioxide Level 29, Anion Gap 8, Blood Urea Nitrogen 20H, Creatinine 0.7, Estimat 

Glomerular Filtration Rate > 60, Glucose Level 271H, Calcium Level 8.4L


7/6/20 05:38: POC Whole Blood Glucose 268H


7/6/20 11:56: POC Whole Blood Glucose [Pending]





Current Medications








 Medications


  (Trade)  Dose


 Ordered  Sig/Toni


 Route


 PRN Reason  Start Time


 Stop Time Status Last Admin


Dose Admin


 


 Acetaminophen


  (Tylenol)  650 mg  Q4H  PRN


 ORAL


 Mild Pain (Pain Scale 1-3)  7/5/20 11:00


 7/30/20 10:59   


 


 


 Amlodipine


 Besylate


  (Norvasc)  5 mg  DAILY


 ORAL


   7/6/20 09:00


 8/2/20 08:59  7/6/20 09:06


 


 


 Aspirin


  (ASA)  81 mg  DAILY


 ORAL


   7/6/20 09:00


 8/14/20 12:29  7/6/20 09:06


 


 


 Cyclobenzaprine


 HCl


  (Flexeril)  10 mg  THREE TIMES A  DAY


 ORAL


   7/5/20 13:00


 7/31/20 22:14  7/6/20 09:07


 


 


 Dextrose


  (Dextrose 50%)  25 ml  Q30M  PRN


 IV


 Hypoglycemia  7/5/20 11:00


 10/2/20 15:29   


 


 


 Dextrose


  (Dextrose 50%)  50 ml  Q30M  PRN


 IV


 Hypoglycemia  7/5/20 11:00


 10/2/20 15:29   


 


 


 Enalapril Maleate


  (Vasotec)  10 mg  DAILY


 ORAL


   7/6/20 09:00


 7/30/20 12:14  7/6/20 09:07


 


 


 Enoxaparin Sodium


  (Lovenox)  40 mg  Q24H


 SUBQ


   7/6/20 09:00


 9/28/20 08:59  7/6/20 09:07


 


 


 Hydralazine HCl


  (Apresoline)  25 mg  Q6H  PRN


 ORAL


 For High Blood Pressure  7/5/20 16:30


 9/28/20 04:29   


 


 


 Insulin Aspart


  (NovoLOG)    BEFORE MEALS AND  HS


 SUBQ


   7/5/20 11:30


 9/28/20 06:29  7/6/20 12:27


 


 


 Insulin Aspart


  (NovoLOG)  20 units  NOVOTIAC


 SUBQ


   7/6/20 11:50


 10/2/20 16:49  7/6/20 12:27


 


 


 Insulin Detemir


  (Levemir)  60 units  BEDTIME


 SUBQ


   7/6/20 21:00


 10/2/20 20:59   


 


 


 Methylprednisolone


  (Medrol)  4 mg  ACBREAKFAST


 ORAL


   7/6/20 06:30


 7/9/20 08:30  7/6/20 05:41


 


 


 Methylprednisolone


  (Medrol)  4 mg  QHS


 ORAL


   7/5/20 21:00


 7/6/20 22:00  7/5/20 21:19


 


 


 Morphine Sulfate


  (Morphine


 Sulfate)  1 mg  Q4H  PRN


 IVP


 Moderate Pain (Pain Scale 4-6)  7/5/20 11:15


 7/7/20 11:14   


 


 


 Morphine Sulfate


  (Morphine


 Sulfate)  4 mg  Q4H  PRN


 IVP


 Severe Pain (Pain Scale 7-10)  7/5/20 11:15


 7/7/20 11:14  7/6/20 04:55


 


 


 Ondansetron HCl


  (Zofran)  4 mg  Q6H  PRN


 IVP


 Nausea & Vomiting  7/5/20 11:15


 7/30/20 11:14   


 


 


 Polyethylene


 Glycol


  (Miralax)  17 gm  HSPRN  PRN


 ORAL


 Constipation  7/5/20 11:15


 8/4/20 11:14   


 











Assessment/Plan


Problems:  


(1) ACS (acute coronary syndrome)


(2) History of diabetes mellitus


(3) History of TIAs


(4) History of hypertension


Assessment/Plan


doing better


Blood sugar better, still 170-200


on Levemir 60 and 20 of Novolog before meals





symptomatic treatment


f/u cardiology recommendation. 


dvt prophylaxis


sliding scale\





dc planning in process, pt prefers to go to a rehab facility.











Sukhdeep Tavarez MD Jul 6, 2020 12:44

## 2020-07-07 VITALS — DIASTOLIC BLOOD PRESSURE: 89 MMHG | SYSTOLIC BLOOD PRESSURE: 148 MMHG

## 2020-07-07 VITALS — DIASTOLIC BLOOD PRESSURE: 82 MMHG | SYSTOLIC BLOOD PRESSURE: 146 MMHG

## 2020-07-07 VITALS — SYSTOLIC BLOOD PRESSURE: 151 MMHG | DIASTOLIC BLOOD PRESSURE: 86 MMHG

## 2020-07-07 VITALS — SYSTOLIC BLOOD PRESSURE: 138 MMHG | DIASTOLIC BLOOD PRESSURE: 75 MMHG

## 2020-07-07 RX ADMIN — CYCLOBENZAPRINE HYDROCHLORIDE SCH MG: 10 TABLET, FILM COATED ORAL at 11:10

## 2020-07-07 RX ADMIN — CYCLOBENZAPRINE HYDROCHLORIDE SCH MG: 10 TABLET, FILM COATED ORAL at 05:56

## 2020-07-07 RX ADMIN — ENOXAPARIN SODIUM SCH MG: 40 INJECTION SUBCUTANEOUS at 09:00

## 2020-07-07 RX ADMIN — HYDRALAZINE HYDROCHLORIDE PRN MG: 25 TABLET ORAL at 04:01

## 2020-07-07 RX ADMIN — INSULIN ASPART SCH UNITS: 100 INJECTION, SOLUTION INTRAVENOUS; SUBCUTANEOUS at 05:54

## 2020-07-07 RX ADMIN — ASPIRIN 81 MG SCH MG: 81 TABLET ORAL at 08:30

## 2020-07-07 RX ADMIN — MORPHINE SULFATE PRN MG: 4 INJECTION INTRAVENOUS at 04:09

## 2020-07-07 RX ADMIN — INSULIN ASPART SCH UNITS: 100 INJECTION, SOLUTION INTRAVENOUS; SUBCUTANEOUS at 05:52

## 2020-07-07 RX ADMIN — ENALAPRIL MALEATE SCH MG: 5 TABLET ORAL at 08:31

## 2020-07-07 NOTE — NUR
NURSE NOTES:

Received report from DIANA Yarbrough. Patient awake in bed. A&Ox4. No distress noted. Saline lock 
in LFA, patent and intact. Planned to discharge in the morning.  Encouraged to call as 
needed for assistance. Bed in low position, locked, side rails up x3, padded. Call light 
within reach. Will continue to monitor.

## 2020-07-07 NOTE — GENERAL PROGRESS NOTE
Assessment/Plan


Problem List:  


(1) ACS (acute coronary syndrome)


ICD Codes:  I24.9 - Acute ischemic heart disease, unspecified


SNOMED:  785925714


(2) History of diabetes mellitus


ICD Codes:  Z86.39 - Personal history of other endocrine, nutritional and 

metabolic disease


SNOMED:  171557282


(3) History of TIAs


ICD Codes:  Z86.73 - Personal history of transient ischemic attack (TIA), and 

cerebral infarction without residual deficits


SNOMED:  251421825


(4) History of hypertension


ICD Codes:  Z86.79 - Personal history of other diseases of the circulatory 

system


SNOMED:  807231564


Assessment/Plan:


continue Levemir 60 units qhs 


continue Novolog 20 units ac tid 


continue Novolog sliding scale ac  hs





Subjective


Allergies:  


Coded Allergies:  


     No Known Allergies (Verified , 6/7/11)


All Systems:  reviewed and negative except above


Subjective


events noted 


glucose values are improving but still on higher side 


Medrol reduced to 4 mg daily 











Item Value  Date Time


 


Bedside Blood Glucose 300 mg/dl H 7/7/20 0554


 


Bedside Blood Glucose 251 mg/dl H 7/6/20 2134


 


Bedside Blood Glucose 134 mg/dl H 7/6/20 1708


 


Bedside Blood Glucose 174 mg/dl H 7/6/20 1230


 


Bedside Blood Glucose 268 mg/dl H 7/6/20 0630











Objective





Last 24 Hour Vital Signs








  Date Time  Temp Pulse Resp B/P (MAP) Pulse Ox O2 Delivery O2 Flow Rate FiO2


 


7/7/20 04:01    151/86    


 


7/7/20 04:00 97.8 90 18 151/86 (107) 98   


 


7/7/20 00:00 97.9 92 18 148/89 (108) 98   


 


7/6/20 21:30    151/84    


 


7/6/20 21:00      Room Air  


 


7/6/20 20:30 98.3 93 18 151/84 (106) 97   


 


7/6/20 19:23  92  169/94 (119)    


 


7/6/20 17:39  92  158/87 (110)    


 


7/6/20 17:08  95  161/87 (111)    


 


7/6/20 16:27    169/90    


 


7/6/20 16:00 98.4 94 16 169/90 (116) 96   


 


7/6/20 15:39  92  165/88 (113)    


 


7/6/20 14:56    172/90    


 


7/6/20 14:55  99  172/90 (117)    


 


7/6/20 12:00 97.7 85 16 150/79 (102) 96   


 


7/6/20 09:07    121/65    


 


7/6/20 09:06  86  121/65    


 


7/6/20 09:00      Nasal Cannula 2.0 


 


7/6/20 08:00 97.7 86 16 121/65 (83) 98   

















Intake and Output  


 


 7/6/20 7/7/20





 19:00 07:00


 


Intake Total 1000 ml 


 


Output Total 1400 ml 


 


Balance -400 ml 


 


  


 


Intake Oral 1000 ml 


 


Output Urine Total 1400 ml 








Laboratory Tests


7/6/20 11:56: POC Whole Blood Glucose [Pending]


7/6/20 16:29: POC Whole Blood Glucose 134H


7/6/20 21:26: POC Whole Blood Glucose 251H


7/7/20 05:43: POC Whole Blood Glucose 300H


Height (Feet):  5


Height (Inches):  8.00


Weight (Pounds):  166


General Appearance:  no apparent distress


Neck:  normal alignment


Cardiovascular:  normal rate, regular rhythm


Respiratory/Chest:  lungs clear


Abdomen:  normal bowel sounds


Pelvis:  normal external exam


Objective





Current Medications








 Medications


  (Trade)  Dose


 Ordered  Sig/Toni


 Route


 PRN Reason  Start Time


 Stop Time Status Last Admin


Dose Admin


 


 Acetaminophen


  (Tylenol)  650 mg  Q4H  PRN


 ORAL


 Mild Pain (Pain Scale 1-3)  7/5/20 11:00


 7/30/20 10:59   


 


 


 Amlodipine


 Besylate


  (Norvasc)  5 mg  DAILY


 ORAL


   7/6/20 09:00


 8/2/20 08:59  7/6/20 09:06


 


 


 Aspirin


  (ASA)  81 mg  DAILY


 ORAL


   7/6/20 09:00


 8/14/20 12:29  7/6/20 09:06


 


 


 Clonidine HCl


  (Catapres Tab)  0.1 mg  BID


 ORAL


   7/6/20 16:23


 10/4/20 16:22  7/6/20 16:27


 


 


 Cyclobenzaprine


 HCl


  (Flexeril)  10 mg  TID@0600,1300,1800


 ORAL


   7/7/20 06:00


 8/4/20 12:59  7/7/20 05:56


 


 


 Dextrose


  (Dextrose 50%)  25 ml  Q30M  PRN


 IV


 Hypoglycemia  7/5/20 11:00


 10/2/20 15:29   


 


 


 Dextrose


  (Dextrose 50%)  50 ml  Q30M  PRN


 IV


 Hypoglycemia  7/5/20 11:00


 10/2/20 15:29   


 


 


 Enalapril Maleate


  (Vasotec)  10 mg  DAILY


 ORAL


   7/6/20 09:00


 7/30/20 12:14  7/6/20 09:07


 


 


 Enoxaparin Sodium


  (Lovenox)  40 mg  Q24H


 SUBQ


   7/6/20 09:00


 9/28/20 08:59  7/6/20 09:07


 


 


 Hydralazine HCl


  (Apresoline)  25 mg  Q6H  PRN


 ORAL


 For High Blood Pressure  7/5/20 16:30


 9/28/20 04:29  7/7/20 04:01


 


 


 Insulin Aspart


  (NovoLOG)    BEFORE MEALS AND  HS


 SUBQ


   7/5/20 11:30


 9/28/20 06:29  7/7/20 05:52


 


 


 Insulin Aspart


  (NovoLOG)  20 units  NOVOTIAC


 SUBQ


   7/6/20 11:50


 10/2/20 16:49  7/7/20 05:54


 


 


 Insulin Detemir


  (Levemir)  60 units  BEDTIME


 SUBQ


   7/6/20 21:00


 10/2/20 20:59  7/6/20 21:33


 


 


 Methylprednisolone


  (Medrol)  4 mg  ACBREAKFAST


 ORAL


   7/6/20 06:30


 7/9/20 08:30  7/7/20 05:55


 


 


 Morphine Sulfate


  (Morphine


 Sulfate)  1 mg  Q4H  PRN


 IVP


 Moderate Pain (Pain Scale 4-6)  7/5/20 11:15


 7/7/20 11:14   


 


 


 Morphine Sulfate


  (Morphine


 Sulfate)  4 mg  Q4H  PRN


 IVP


 Severe Pain (Pain Scale 7-10)  7/5/20 11:15


 7/7/20 11:14  7/7/20 04:09


 


 


 Ondansetron HCl


  (Zofran)  4 mg  Q6H  PRN


 IVP


 Nausea & Vomiting  7/5/20 11:15


 7/30/20 11:14   


 


 


 Polyethylene


 Glycol


  (Miralax)  17 gm  HSPRN  PRN


 ORAL


 Constipation  7/5/20 11:15


 8/4/20 11:14  7/6/20 23:50


 

















Vasile Simeon MD Jul 7, 2020 06:17

## 2020-07-07 NOTE — NUR
NURSE NOTES:

Pt in stable condition. Vital signs stable. All belongings were accounted for. Discharge 
packet and report given to ambulance. Report already given to the facility yesterday. Called 
the facility for ETA. Pt transferred to [facility] via gurney by ambulance.